# Patient Record
Sex: FEMALE | Race: WHITE | NOT HISPANIC OR LATINO
[De-identification: names, ages, dates, MRNs, and addresses within clinical notes are randomized per-mention and may not be internally consistent; named-entity substitution may affect disease eponyms.]

---

## 2017-09-01 ENCOUNTER — LABORATORY RESULT (OUTPATIENT)
Age: 37
End: 2017-09-01

## 2017-09-01 ENCOUNTER — APPOINTMENT (OUTPATIENT)
Dept: PEDIATRIC MEDICAL GENETICS | Facility: CLINIC | Age: 37
End: 2017-09-01
Payer: COMMERCIAL

## 2017-09-01 PROCEDURE — 36415 COLL VENOUS BLD VENIPUNCTURE: CPT

## 2017-09-01 PROCEDURE — 96040: CPT

## 2017-09-11 ENCOUNTER — ASOB RESULT (OUTPATIENT)
Age: 37
End: 2017-09-11

## 2017-09-11 ENCOUNTER — APPOINTMENT (OUTPATIENT)
Dept: ANTEPARTUM | Facility: CLINIC | Age: 37
End: 2017-09-11
Payer: COMMERCIAL

## 2017-09-11 PROCEDURE — 36416 COLLJ CAPILLARY BLOOD SPEC: CPT

## 2017-09-11 PROCEDURE — 76801 OB US < 14 WKS SINGLE FETUS: CPT

## 2017-09-11 PROCEDURE — 76813 OB US NUCHAL MEAS 1 GEST: CPT

## 2017-11-06 ENCOUNTER — ASOB RESULT (OUTPATIENT)
Age: 37
End: 2017-11-06

## 2017-11-06 ENCOUNTER — APPOINTMENT (OUTPATIENT)
Dept: ANTEPARTUM | Facility: CLINIC | Age: 37
End: 2017-11-06
Payer: COMMERCIAL

## 2017-11-06 PROCEDURE — 76811 OB US DETAILED SNGL FETUS: CPT

## 2017-11-10 ENCOUNTER — APPOINTMENT (OUTPATIENT)
Dept: ANTEPARTUM | Facility: CLINIC | Age: 37
End: 2017-11-10
Payer: COMMERCIAL

## 2017-11-10 ENCOUNTER — ASOB RESULT (OUTPATIENT)
Age: 37
End: 2017-11-10

## 2017-11-10 PROCEDURE — 76816 OB US FOLLOW-UP PER FETUS: CPT

## 2017-12-22 ENCOUNTER — OUTPATIENT (OUTPATIENT)
Dept: OUTPATIENT SERVICES | Facility: HOSPITAL | Age: 37
LOS: 1 days | End: 2017-12-22

## 2017-12-22 DIAGNOSIS — O26.899 OTHER SPECIFIED PREGNANCY RELATED CONDITIONS, UNSPECIFIED TRIMESTER: ICD-10-CM

## 2017-12-23 LAB
APPEARANCE UR: CLEAR — SIGNIFICANT CHANGE UP
BACTERIA # UR AUTO: SIGNIFICANT CHANGE UP
BILIRUB UR-MCNC: NEGATIVE — SIGNIFICANT CHANGE UP
BLOOD UR QL VISUAL: HIGH
COLOR SPEC: SIGNIFICANT CHANGE UP
GLUCOSE UR-MCNC: NEGATIVE — SIGNIFICANT CHANGE UP
KETONES UR-MCNC: NEGATIVE — SIGNIFICANT CHANGE UP
LEUKOCYTE ESTERASE UR-ACNC: NEGATIVE — SIGNIFICANT CHANGE UP
MUCOUS THREADS # UR AUTO: SIGNIFICANT CHANGE UP
NITRITE UR-MCNC: NEGATIVE — SIGNIFICANT CHANGE UP
NON-SQ EPI CELLS # UR AUTO: <1 — SIGNIFICANT CHANGE UP
PH UR: 6.5 — SIGNIFICANT CHANGE UP (ref 4.6–8)
PROT UR-MCNC: NEGATIVE MG/DL — SIGNIFICANT CHANGE UP
RBC CASTS # UR COMP ASSIST: SIGNIFICANT CHANGE UP (ref 0–?)
SP GR SPEC: 1 — SIGNIFICANT CHANGE UP (ref 1–1.04)
SQUAMOUS # UR AUTO: SIGNIFICANT CHANGE UP
UROBILINOGEN FLD QL: NORMAL MG/DL — SIGNIFICANT CHANGE UP
WBC CLUMPS #/AREA URNS HPF: PRESENT — HIGH (ref 0–?)
WBC UR QL: SIGNIFICANT CHANGE UP (ref 0–?)

## 2018-02-19 ENCOUNTER — TRANSCRIPTION ENCOUNTER (OUTPATIENT)
Age: 38
End: 2018-02-19

## 2018-02-19 ENCOUNTER — INPATIENT (INPATIENT)
Facility: HOSPITAL | Age: 38
LOS: 4 days | Discharge: HOME HEALTH | End: 2018-02-24
Attending: OBSTETRICS & GYNECOLOGY | Admitting: OBSTETRICS & GYNECOLOGY
Payer: COMMERCIAL

## 2018-02-19 VITALS — WEIGHT: 194.01 LBS | HEIGHT: 64 IN

## 2018-02-19 DIAGNOSIS — O26.899 OTHER SPECIFIED PREGNANCY RELATED CONDITIONS, UNSPECIFIED TRIMESTER: ICD-10-CM

## 2018-02-19 DIAGNOSIS — Z3A.00 WEEKS OF GESTATION OF PREGNANCY NOT SPECIFIED: ICD-10-CM

## 2018-02-19 LAB
ALBUMIN SERPL ELPH-MCNC: 2.9 G/DL — LOW (ref 3.3–5)
ALBUMIN SERPL ELPH-MCNC: 3 G/DL — LOW (ref 3.3–5)
ALP SERPL-CCNC: 162 U/L — HIGH (ref 40–120)
ALP SERPL-CCNC: 183 U/L — HIGH (ref 40–120)
ALT FLD-CCNC: 5 U/L — SIGNIFICANT CHANGE UP (ref 4–33)
ALT FLD-CCNC: 6 U/L — SIGNIFICANT CHANGE UP (ref 4–33)
APPEARANCE UR: SIGNIFICANT CHANGE UP
APTT BLD: 25.1 SEC — LOW (ref 27.5–37.4)
APTT BLD: 25.7 SEC — LOW (ref 27.5–37.4)
AST SERPL-CCNC: 15 U/L — SIGNIFICANT CHANGE UP (ref 4–32)
AST SERPL-CCNC: 17 U/L — SIGNIFICANT CHANGE UP (ref 4–32)
BACTERIA # UR AUTO: SIGNIFICANT CHANGE UP
BASOPHILS # BLD AUTO: 0.03 K/UL — SIGNIFICANT CHANGE UP (ref 0–0.2)
BASOPHILS # BLD AUTO: 0.03 K/UL — SIGNIFICANT CHANGE UP (ref 0–0.2)
BASOPHILS NFR BLD AUTO: 0.2 % — SIGNIFICANT CHANGE UP (ref 0–2)
BASOPHILS NFR BLD AUTO: 0.4 % — SIGNIFICANT CHANGE UP (ref 0–2)
BILIRUB SERPL-MCNC: 0.2 MG/DL — SIGNIFICANT CHANGE UP (ref 0.2–1.2)
BILIRUB SERPL-MCNC: 0.2 MG/DL — SIGNIFICANT CHANGE UP (ref 0.2–1.2)
BILIRUB UR-MCNC: NEGATIVE — SIGNIFICANT CHANGE UP
BLD GP AB SCN SERPL QL: NEGATIVE — SIGNIFICANT CHANGE UP
BLOOD UR QL VISUAL: NEGATIVE — SIGNIFICANT CHANGE UP
BUN SERPL-MCNC: 5 MG/DL — LOW (ref 7–23)
BUN SERPL-MCNC: 6 MG/DL — LOW (ref 7–23)
CALCIUM SERPL-MCNC: 8.3 MG/DL — LOW (ref 8.4–10.5)
CALCIUM SERPL-MCNC: 8.5 MG/DL — SIGNIFICANT CHANGE UP (ref 8.4–10.5)
CHLORIDE SERPL-SCNC: 101 MMOL/L — SIGNIFICANT CHANGE UP (ref 98–107)
CHLORIDE SERPL-SCNC: 104 MMOL/L — SIGNIFICANT CHANGE UP (ref 98–107)
CO2 SERPL-SCNC: 20 MMOL/L — LOW (ref 22–31)
CO2 SERPL-SCNC: 21 MMOL/L — LOW (ref 22–31)
COLOR SPEC: YELLOW — SIGNIFICANT CHANGE UP
CREAT ?TM UR-MCNC: 68.7 MG/DL — SIGNIFICANT CHANGE UP
CREAT SERPL-MCNC: 0.53 MG/DL — SIGNIFICANT CHANGE UP (ref 0.5–1.3)
CREAT SERPL-MCNC: 0.6 MG/DL — SIGNIFICANT CHANGE UP (ref 0.5–1.3)
EOSINOPHIL # BLD AUTO: 0 K/UL — SIGNIFICANT CHANGE UP (ref 0–0.5)
EOSINOPHIL # BLD AUTO: 0.04 K/UL — SIGNIFICANT CHANGE UP (ref 0–0.5)
EOSINOPHIL NFR BLD AUTO: 0 % — SIGNIFICANT CHANGE UP (ref 0–6)
EOSINOPHIL NFR BLD AUTO: 0.5 % — SIGNIFICANT CHANGE UP (ref 0–6)
FIBRINOGEN PPP-MCNC: 533 MG/DL — HIGH (ref 310–510)
FIBRINOGEN PPP-MCNC: 608 MG/DL — HIGH (ref 310–510)
GLUCOSE SERPL-MCNC: 101 MG/DL — HIGH (ref 70–99)
GLUCOSE SERPL-MCNC: 80 MG/DL — SIGNIFICANT CHANGE UP (ref 70–99)
GLUCOSE UR-MCNC: NEGATIVE — SIGNIFICANT CHANGE UP
HBV SURFACE AG SER-ACNC: NEGATIVE — SIGNIFICANT CHANGE UP
HCT VFR BLD CALC: 31.8 % — LOW (ref 34.5–45)
HCT VFR BLD CALC: 35.3 % — SIGNIFICANT CHANGE UP (ref 34.5–45)
HGB BLD-MCNC: 10.4 G/DL — LOW (ref 11.5–15.5)
HGB BLD-MCNC: 11.5 G/DL — SIGNIFICANT CHANGE UP (ref 11.5–15.5)
HIV1 AG SER QL: SIGNIFICANT CHANGE UP
HIV1+2 AB SPEC QL: SIGNIFICANT CHANGE UP
IMM GRANULOCYTES # BLD AUTO: 0.02 # — SIGNIFICANT CHANGE UP
IMM GRANULOCYTES # BLD AUTO: 0.07 # — SIGNIFICANT CHANGE UP
IMM GRANULOCYTES NFR BLD AUTO: 0.3 % — SIGNIFICANT CHANGE UP (ref 0–1.5)
IMM GRANULOCYTES NFR BLD AUTO: 0.4 % — SIGNIFICANT CHANGE UP (ref 0–1.5)
INR BLD: 0.81 — LOW (ref 0.88–1.17)
INR BLD: 0.87 — LOW (ref 0.88–1.17)
KETONES UR-MCNC: NEGATIVE — SIGNIFICANT CHANGE UP
LDH SERPL L TO P-CCNC: 232 U/L — HIGH (ref 135–225)
LDH SERPL L TO P-CCNC: 257 U/L — HIGH (ref 135–225)
LEUKOCYTE ESTERASE UR-ACNC: NEGATIVE — SIGNIFICANT CHANGE UP
LYMPHOCYTES # BLD AUTO: 0.55 K/UL — LOW (ref 1–3.3)
LYMPHOCYTES # BLD AUTO: 1.18 K/UL — SIGNIFICANT CHANGE UP (ref 1–3.3)
LYMPHOCYTES # BLD AUTO: 14.8 % — SIGNIFICANT CHANGE UP (ref 13–44)
LYMPHOCYTES # BLD AUTO: 3.1 % — LOW (ref 13–44)
MCHC RBC-ENTMCNC: 29.6 PG — SIGNIFICANT CHANGE UP (ref 27–34)
MCHC RBC-ENTMCNC: 30 PG — SIGNIFICANT CHANGE UP (ref 27–34)
MCHC RBC-ENTMCNC: 32.6 % — SIGNIFICANT CHANGE UP (ref 32–36)
MCHC RBC-ENTMCNC: 32.7 % — SIGNIFICANT CHANGE UP (ref 32–36)
MCV RBC AUTO: 90.7 FL — SIGNIFICANT CHANGE UP (ref 80–100)
MCV RBC AUTO: 91.6 FL — SIGNIFICANT CHANGE UP (ref 80–100)
MONOCYTES # BLD AUTO: 0.47 K/UL — SIGNIFICANT CHANGE UP (ref 0–0.9)
MONOCYTES # BLD AUTO: 0.63 K/UL — SIGNIFICANT CHANGE UP (ref 0–0.9)
MONOCYTES NFR BLD AUTO: 2.6 % — SIGNIFICANT CHANGE UP (ref 2–14)
MONOCYTES NFR BLD AUTO: 7.9 % — SIGNIFICANT CHANGE UP (ref 2–14)
MUCOUS THREADS # UR AUTO: SIGNIFICANT CHANGE UP
NEUTROPHILS # BLD AUTO: 16.79 K/UL — HIGH (ref 1.8–7.4)
NEUTROPHILS # BLD AUTO: 6.07 K/UL — SIGNIFICANT CHANGE UP (ref 1.8–7.4)
NEUTROPHILS NFR BLD AUTO: 76.1 % — SIGNIFICANT CHANGE UP (ref 43–77)
NEUTROPHILS NFR BLD AUTO: 93.7 % — HIGH (ref 43–77)
NITRITE UR-MCNC: NEGATIVE — SIGNIFICANT CHANGE UP
NON-SQ EPI CELLS # UR AUTO: <1 — SIGNIFICANT CHANGE UP
NRBC # FLD: 0 — SIGNIFICANT CHANGE UP
NRBC # FLD: 0 — SIGNIFICANT CHANGE UP
PH UR: 7 — SIGNIFICANT CHANGE UP (ref 4.6–8)
PLATELET # BLD AUTO: 358 K/UL — SIGNIFICANT CHANGE UP (ref 150–400)
PLATELET # BLD AUTO: 375 K/UL — SIGNIFICANT CHANGE UP (ref 150–400)
PMV BLD: 10.7 FL — SIGNIFICANT CHANGE UP (ref 7–13)
PMV BLD: 10.9 FL — SIGNIFICANT CHANGE UP (ref 7–13)
POTASSIUM SERPL-MCNC: 3.9 MMOL/L — SIGNIFICANT CHANGE UP (ref 3.5–5.3)
POTASSIUM SERPL-MCNC: 4 MMOL/L — SIGNIFICANT CHANGE UP (ref 3.5–5.3)
POTASSIUM SERPL-SCNC: 3.9 MMOL/L — SIGNIFICANT CHANGE UP (ref 3.5–5.3)
POTASSIUM SERPL-SCNC: 4 MMOL/L — SIGNIFICANT CHANGE UP (ref 3.5–5.3)
PROT SERPL-MCNC: 5.4 G/DL — LOW (ref 6–8.3)
PROT SERPL-MCNC: 5.8 G/DL — LOW (ref 6–8.3)
PROT UR-MCNC: 30 MG/DL — HIGH
PROT UR-MCNC: 32.7 MG/DL — SIGNIFICANT CHANGE UP
PROTHROM AB SERPL-ACNC: 8.9 SEC — LOW (ref 9.8–13.1)
PROTHROM AB SERPL-ACNC: 9.6 SEC — LOW (ref 9.8–13.1)
RBC # BLD: 3.47 M/UL — LOW (ref 3.8–5.2)
RBC # BLD: 3.89 M/UL — SIGNIFICANT CHANGE UP (ref 3.8–5.2)
RBC # FLD: 12.3 % — SIGNIFICANT CHANGE UP (ref 10.3–14.5)
RBC # FLD: 12.4 % — SIGNIFICANT CHANGE UP (ref 10.3–14.5)
RBC CASTS # UR COMP ASSIST: HIGH (ref 0–?)
RH IG SCN BLD-IMP: POSITIVE — SIGNIFICANT CHANGE UP
RH IG SCN BLD-IMP: POSITIVE — SIGNIFICANT CHANGE UP
RUBV IGG SER-ACNC: 5.6 INDEX — SIGNIFICANT CHANGE UP
RUBV IGG SER-IMP: POSITIVE — SIGNIFICANT CHANGE UP
SODIUM SERPL-SCNC: 136 MMOL/L — SIGNIFICANT CHANGE UP (ref 135–145)
SODIUM SERPL-SCNC: 138 MMOL/L — SIGNIFICANT CHANGE UP (ref 135–145)
SP GR SPEC: 1.01 — SIGNIFICANT CHANGE UP (ref 1–1.04)
SQUAMOUS # UR AUTO: SIGNIFICANT CHANGE UP
URATE SERPL-MCNC: 5.5 MG/DL — SIGNIFICANT CHANGE UP (ref 2.5–7)
URATE SERPL-MCNC: 6 MG/DL — SIGNIFICANT CHANGE UP (ref 2.5–7)
UROBILINOGEN FLD QL: NORMAL MG/DL — SIGNIFICANT CHANGE UP
WBC # BLD: 17.91 K/UL — HIGH (ref 3.8–10.5)
WBC # BLD: 7.97 K/UL — SIGNIFICANT CHANGE UP (ref 3.8–10.5)
WBC # FLD AUTO: 17.91 K/UL — HIGH (ref 3.8–10.5)
WBC # FLD AUTO: 7.97 K/UL — SIGNIFICANT CHANGE UP (ref 3.8–10.5)
WBC UR QL: SIGNIFICANT CHANGE UP (ref 0–?)

## 2018-02-19 RX ORDER — AMPICILLIN TRIHYDRATE 250 MG
2 CAPSULE ORAL EVERY 6 HOURS
Qty: 0 | Refills: 0 | Status: DISCONTINUED | OUTPATIENT
Start: 2018-02-19 | End: 2018-02-19

## 2018-02-19 RX ORDER — CITRIC ACID/SODIUM CITRATE 300-500 MG
30 SOLUTION, ORAL ORAL ONCE
Qty: 0 | Refills: 0 | Status: COMPLETED | OUTPATIENT
Start: 2018-02-19 | End: 2018-02-19

## 2018-02-19 RX ORDER — GLYCERIN ADULT
1 SUPPOSITORY, RECTAL RECTAL AT BEDTIME
Qty: 0 | Refills: 0 | Status: DISCONTINUED | OUTPATIENT
Start: 2018-02-20 | End: 2018-02-24

## 2018-02-19 RX ORDER — HEPARIN SODIUM 5000 [USP'U]/ML
5000 INJECTION INTRAVENOUS; SUBCUTANEOUS EVERY 12 HOURS
Qty: 0 | Refills: 0 | Status: DISCONTINUED | OUTPATIENT
Start: 2018-02-19 | End: 2018-02-24

## 2018-02-19 RX ORDER — ESCITALOPRAM OXALATE 10 MG/1
40 TABLET, FILM COATED ORAL DAILY
Qty: 0 | Refills: 0 | Status: DISCONTINUED | OUTPATIENT
Start: 2018-02-19 | End: 2018-02-24

## 2018-02-19 RX ORDER — KETOROLAC TROMETHAMINE 30 MG/ML
30 SYRINGE (ML) INJECTION ONCE
Qty: 0 | Refills: 0 | Status: DISCONTINUED | OUTPATIENT
Start: 2018-02-19 | End: 2018-02-19

## 2018-02-19 RX ORDER — TETANUS TOXOID, REDUCED DIPHTHERIA TOXOID AND ACELLULAR PERTUSSIS VACCINE, ADSORBED 5; 2.5; 8; 8; 2.5 [IU]/.5ML; [IU]/.5ML; UG/.5ML; UG/.5ML; UG/.5ML
0.5 SUSPENSION INTRAMUSCULAR ONCE
Qty: 0 | Refills: 0 | Status: DISCONTINUED | OUTPATIENT
Start: 2018-02-20 | End: 2018-02-24

## 2018-02-19 RX ORDER — NALOXONE HYDROCHLORIDE 4 MG/.1ML
0.1 SPRAY NASAL
Qty: 0 | Refills: 0 | Status: DISCONTINUED | OUTPATIENT
Start: 2018-02-19 | End: 2018-02-24

## 2018-02-19 RX ORDER — SODIUM CHLORIDE 9 MG/ML
1000 INJECTION, SOLUTION INTRAVENOUS ONCE
Qty: 0 | Refills: 0 | Status: COMPLETED | OUTPATIENT
Start: 2018-02-19 | End: 2018-02-19

## 2018-02-19 RX ORDER — LANOLIN
1 OINTMENT (GRAM) TOPICAL
Qty: 0 | Refills: 0 | Status: DISCONTINUED | OUTPATIENT
Start: 2018-02-20 | End: 2018-02-24

## 2018-02-19 RX ORDER — DIPHENHYDRAMINE HCL 50 MG
25 CAPSULE ORAL EVERY 6 HOURS
Qty: 0 | Refills: 0 | Status: DISCONTINUED | OUTPATIENT
Start: 2018-02-20 | End: 2018-02-24

## 2018-02-19 RX ORDER — METOCLOPRAMIDE HCL 10 MG
10 TABLET ORAL ONCE
Qty: 0 | Refills: 0 | Status: COMPLETED | OUTPATIENT
Start: 2018-02-19 | End: 2018-02-19

## 2018-02-19 RX ORDER — SIMETHICONE 80 MG/1
80 TABLET, CHEWABLE ORAL EVERY 4 HOURS
Qty: 0 | Refills: 0 | Status: DISCONTINUED | OUTPATIENT
Start: 2018-02-20 | End: 2018-02-24

## 2018-02-19 RX ORDER — OXYCODONE HYDROCHLORIDE 5 MG/1
5 TABLET ORAL
Qty: 0 | Refills: 0 | Status: DISCONTINUED | OUTPATIENT
Start: 2018-02-19 | End: 2018-02-24

## 2018-02-19 RX ORDER — CITRIC ACID/SODIUM CITRATE 300-500 MG
15 SOLUTION, ORAL ORAL EVERY 4 HOURS
Qty: 0 | Refills: 0 | Status: DISCONTINUED | OUTPATIENT
Start: 2018-02-19 | End: 2018-02-19

## 2018-02-19 RX ORDER — OXYTOCIN 10 UNIT/ML
41.67 VIAL (ML) INJECTION
Qty: 20 | Refills: 0 | Status: DISCONTINUED | OUTPATIENT
Start: 2018-02-19 | End: 2018-02-24

## 2018-02-19 RX ORDER — DIPHENHYDRAMINE HCL 50 MG
25 CAPSULE ORAL ONCE
Qty: 0 | Refills: 0 | Status: DISCONTINUED | OUTPATIENT
Start: 2018-02-19 | End: 2018-02-24

## 2018-02-19 RX ORDER — MORPHINE SULFATE 50 MG/1
3 CAPSULE, EXTENDED RELEASE ORAL ONCE
Qty: 0 | Refills: 0 | Status: DISCONTINUED | OUTPATIENT
Start: 2018-02-19 | End: 2018-02-24

## 2018-02-19 RX ORDER — OXYCODONE HYDROCHLORIDE 5 MG/1
10 TABLET ORAL
Qty: 0 | Refills: 0 | Status: DISCONTINUED | OUTPATIENT
Start: 2018-02-19 | End: 2018-02-24

## 2018-02-19 RX ORDER — IBUPROFEN 200 MG
600 TABLET ORAL EVERY 6 HOURS
Qty: 0 | Refills: 0 | Status: COMPLETED | OUTPATIENT
Start: 2018-02-19 | End: 2019-01-18

## 2018-02-19 RX ORDER — OXYTOCIN 10 UNIT/ML
41.67 VIAL (ML) INJECTION
Qty: 20 | Refills: 0 | Status: DISCONTINUED | OUTPATIENT
Start: 2018-02-19 | End: 2018-02-19

## 2018-02-19 RX ORDER — ACETAMINOPHEN 500 MG
975 TABLET ORAL EVERY 6 HOURS
Qty: 0 | Refills: 0 | Status: DISCONTINUED | OUTPATIENT
Start: 2018-02-19 | End: 2018-02-24

## 2018-02-19 RX ORDER — OXYCODONE HYDROCHLORIDE 5 MG/1
5 TABLET ORAL EVERY 4 HOURS
Qty: 0 | Refills: 0 | Status: DISCONTINUED | OUTPATIENT
Start: 2018-02-19 | End: 2018-02-24

## 2018-02-19 RX ORDER — FAMOTIDINE 10 MG/ML
20 INJECTION INTRAVENOUS ONCE
Qty: 0 | Refills: 0 | Status: COMPLETED | OUTPATIENT
Start: 2018-02-19 | End: 2018-02-19

## 2018-02-19 RX ORDER — DOCUSATE SODIUM 100 MG
100 CAPSULE ORAL
Qty: 0 | Refills: 0 | Status: DISCONTINUED | OUTPATIENT
Start: 2018-02-20 | End: 2018-02-24

## 2018-02-19 RX ORDER — SODIUM CHLORIDE 9 MG/ML
1000 INJECTION, SOLUTION INTRAVENOUS
Qty: 0 | Refills: 0 | Status: DISCONTINUED | OUTPATIENT
Start: 2018-02-19 | End: 2018-02-19

## 2018-02-19 RX ORDER — AMPICILLIN TRIHYDRATE 250 MG
2 CAPSULE ORAL ONCE
Qty: 0 | Refills: 0 | Status: COMPLETED | OUTPATIENT
Start: 2018-02-19 | End: 2018-02-19

## 2018-02-19 RX ORDER — AMITRIPTYLINE HCL 25 MG
150 TABLET ORAL AT BEDTIME
Qty: 0 | Refills: 0 | Status: DISCONTINUED | OUTPATIENT
Start: 2018-02-19 | End: 2018-02-24

## 2018-02-19 RX ORDER — IBUPROFEN 200 MG
1 TABLET ORAL
Qty: 0 | Refills: 0 | COMMUNITY
Start: 2018-02-19

## 2018-02-19 RX ORDER — SODIUM CHLORIDE 9 MG/ML
1000 INJECTION, SOLUTION INTRAVENOUS
Qty: 0 | Refills: 0 | Status: DISCONTINUED | OUTPATIENT
Start: 2018-02-19 | End: 2018-02-20

## 2018-02-19 RX ORDER — OXYTOCIN 10 UNIT/ML
333.33 VIAL (ML) INJECTION
Qty: 20 | Refills: 0 | Status: DISCONTINUED | OUTPATIENT
Start: 2018-02-19 | End: 2018-02-24

## 2018-02-19 RX ORDER — AMPICILLIN TRIHYDRATE 250 MG
CAPSULE ORAL
Qty: 0 | Refills: 0 | Status: DISCONTINUED | OUTPATIENT
Start: 2018-02-19 | End: 2018-02-19

## 2018-02-19 RX ORDER — KETOROLAC TROMETHAMINE 30 MG/ML
30 SYRINGE (ML) INJECTION EVERY 6 HOURS
Qty: 0 | Refills: 0 | Status: COMPLETED | OUTPATIENT
Start: 2018-02-19 | End: 2018-02-21

## 2018-02-19 RX ORDER — SODIUM CHLORIDE 9 MG/ML
1000 INJECTION, SOLUTION INTRAVENOUS
Qty: 0 | Refills: 0 | Status: DISCONTINUED | OUTPATIENT
Start: 2018-02-19 | End: 2018-02-24

## 2018-02-19 RX ORDER — OXYTOCIN 10 UNIT/ML
333.33 VIAL (ML) INJECTION
Qty: 20 | Refills: 0 | Status: DISCONTINUED | OUTPATIENT
Start: 2018-02-19 | End: 2018-02-19

## 2018-02-19 RX ORDER — ONDANSETRON 8 MG/1
4 TABLET, FILM COATED ORAL EVERY 6 HOURS
Qty: 0 | Refills: 0 | Status: DISCONTINUED | OUTPATIENT
Start: 2018-02-19 | End: 2018-02-24

## 2018-02-19 RX ORDER — HYDROMORPHONE HYDROCHLORIDE 2 MG/ML
1 INJECTION INTRAMUSCULAR; INTRAVENOUS; SUBCUTANEOUS
Qty: 0 | Refills: 0 | Status: DISCONTINUED | OUTPATIENT
Start: 2018-02-19 | End: 2018-02-24

## 2018-02-19 RX ADMIN — Medication 216 GRAM(S): at 09:30

## 2018-02-19 RX ADMIN — Medication 975 MILLIGRAM(S): at 22:47

## 2018-02-19 RX ADMIN — Medication 0.5 MILLIGRAM(S): at 10:51

## 2018-02-19 RX ADMIN — Medication 12 MILLIGRAM(S): at 09:12

## 2018-02-19 RX ADMIN — SODIUM CHLORIDE 2000 MILLILITER(S): 9 INJECTION, SOLUTION INTRAVENOUS at 11:30

## 2018-02-19 RX ADMIN — Medication 0.5 MILLIGRAM(S): at 17:29

## 2018-02-19 RX ADMIN — Medication 125 MILLIUNIT(S)/MIN: at 18:16

## 2018-02-19 RX ADMIN — SODIUM CHLORIDE 125 MILLILITER(S): 9 INJECTION, SOLUTION INTRAVENOUS at 23:00

## 2018-02-19 RX ADMIN — Medication 30 MILLIGRAM(S): at 15:58

## 2018-02-19 RX ADMIN — SODIUM CHLORIDE 75 MILLILITER(S): 9 INJECTION, SOLUTION INTRAVENOUS at 18:16

## 2018-02-19 RX ADMIN — Medication 150 MILLIGRAM(S): at 21:17

## 2018-02-19 RX ADMIN — SODIUM CHLORIDE 2000 MILLILITER(S): 9 INJECTION, SOLUTION INTRAVENOUS at 13:52

## 2018-02-19 RX ADMIN — Medication 30 MILLILITER(S): at 12:00

## 2018-02-19 RX ADMIN — SODIUM CHLORIDE 250 MILLILITER(S): 9 INJECTION, SOLUTION INTRAVENOUS at 09:51

## 2018-02-19 RX ADMIN — HEPARIN SODIUM 5000 UNIT(S): 5000 INJECTION INTRAVENOUS; SUBCUTANEOUS at 21:15

## 2018-02-19 RX ADMIN — FAMOTIDINE 20 MILLIGRAM(S): 10 INJECTION INTRAVENOUS at 11:00

## 2018-02-19 RX ADMIN — Medication 975 MILLIGRAM(S): at 23:17

## 2018-02-19 RX ADMIN — Medication 0.5 MILLIGRAM(S): at 21:17

## 2018-02-19 RX ADMIN — Medication 10 MILLIGRAM(S): at 11:00

## 2018-02-19 NOTE — PROVIDER CONTACT NOTE (OTHER) - ASSESSMENT
lungs clear, reflexes 2+, fundus firm without lmassage, bleeding scant,no complain of shortness of breath or headache or dizziness

## 2018-02-19 NOTE — DISCHARGE NOTE OB - PLAN OF CARE
post partum recovery, vaginal rest, light activity postpartum preeclampsia . mgso4  and procardia Now stable and recovered postoperative recovery

## 2018-02-19 NOTE — DISCHARGE NOTE OB - MEDICATION SUMMARY - MEDICATIONS TO STOP TAKING
I will STOP taking the medications listed below when I get home from the hospital:    Prenatal 1 Prenatal Multivitamins with Folic Acid 0.975 mg oral capsule  -- 1 cap(s) by mouth once a day

## 2018-02-19 NOTE — DISCHARGE NOTE OB - HOME CARE AGENCY
[facility]  [phone] .   Nurse to call and visit day after discharge 2/25/2018

## 2018-02-19 NOTE — DISCHARGE NOTE OB - PATIENT PORTAL LINK FT
You can access the [facility] Patient Portal, offered by [facility], by registering with the following website: [email or web address]

## 2018-02-19 NOTE — DISCHARGE NOTE OB - HOSPITAL COURSE
Admitted with ruptured membranes at 35.6 weeks. Repeat LFT  section performed. Admitted with ruptured membranes at 35.6 weeks. Repeat LFT  section performed. Postpartum preeclampsia - received Mg so4 and Procardia x1 with resolution of blood pressure elevation On POD#5 stable normal Blood pressures with one bp 140/70 off Procardia - discharged to home on Procardia and referral for home nursing care visitation

## 2018-02-19 NOTE — DISCHARGE NOTE OB - CARE PROVIDER_API CALL
Rocío Perez), Obstetrics and Gynecology  [address] [place] [address]  Phone: [phone]  Fax: [phone]

## 2018-02-19 NOTE — CHART NOTE - NSCHARTNOTEFT_GEN_A_CORE
S: Patient examined at bedside for elevated BPs. According to nursing, patient had severe range BPs while patient was experiencing pain and anxiety. BPs decreased following ativan and toradol. Patient has no acute complaints at this time. Pain now controlled. Vaginal bleeding is appropriate. Denies HA, changes in vision, lightheadedness, dizziness, CP, SOB, palpitations, calf tenderness. Per patient, she had mildly elevated BPs earlier in the pregnancy and was unable to complete the 24hr urine 2/2 her schedule. Patient also reports history of severe anxiety, taking ativan 4-6 times per day. HELLP labs were sent at admission and were WNL.     O: /90    Gen: NAD  CV: tachycardia, regular rhythm, no murmurs  Resp: CTA  Abd: soft, appropriately tender, pfannenstiel incision with dressing in place  : lochia WNL, no expression of blood with fundal massage  LE: no calf tenderness  Neuro: CN II-XII intact, EOMI, PERRLA     A/P 37   POD#0 from [facility] for PPROM evaluated for elevated BPs. PMHx of anxiety and depression  - Stat HELLP labs WNL  - Will continue to monitor BPs at this time as BPs may be 2/2 severe anxiety. Will consider labetalol if pressures remain elevated (150s/90s)  - Encourage po hydration  - pad counts  - Routine postpartum care  - Will get social work consult postpartum    d/w Dr. Devon Kemp pgy1

## 2018-02-19 NOTE — PATIENT PROFILE OB - CURRENT PREGNANCY COMPLICATIONS, OB PROFILE
Maternal Unknown GBS/ Labor/Gestational Age less than 36 Weeks/ Premature Rupture of Membranes (PPROM)

## 2018-02-19 NOTE — DISCHARGE NOTE OB - ADDITIONAL INSTRUCTIONS
Call office to make appt in 2 weeks Call office to make appt in 2 weeks  Call doctor if blood pressure is higher than 155/90

## 2018-02-19 NOTE — DISCHARGE NOTE OB - CARE PLAN
Principal Discharge DX:	Premature rupture of membranes (PROM) affecting second pregnancy  Goal:	post partum recovery, vaginal rest, light activity  Secondary Diagnosis:	 delivery delivered Principal Discharge DX:	Premature rupture of membranes (PROM) affecting second pregnancy  Goal:	post partum recovery, vaginal rest, light activity  Assessment and plan of treatment:	postpartum preeclampsia . mgso4  and procardia Now stable and recovered  Secondary Diagnosis:	 delivery delivered  Goal:	postoperative recovery

## 2018-02-20 LAB
BASOPHILS # BLD AUTO: 0.01 K/UL — SIGNIFICANT CHANGE UP (ref 0–0.2)
BASOPHILS NFR BLD AUTO: 0.1 % — SIGNIFICANT CHANGE UP (ref 0–2)
EOSINOPHIL # BLD AUTO: 0 K/UL — SIGNIFICANT CHANGE UP (ref 0–0.5)
EOSINOPHIL NFR BLD AUTO: 0 % — SIGNIFICANT CHANGE UP (ref 0–6)
HCT VFR BLD CALC: 26.4 % — LOW (ref 34.5–45)
HGB BLD-MCNC: 8.7 G/DL — LOW (ref 11.5–15.5)
IMM GRANULOCYTES # BLD AUTO: 0.07 # — SIGNIFICANT CHANGE UP
IMM GRANULOCYTES NFR BLD AUTO: 0.5 % — SIGNIFICANT CHANGE UP (ref 0–1.5)
LYMPHOCYTES # BLD AUTO: 1.13 K/UL — SIGNIFICANT CHANGE UP (ref 1–3.3)
LYMPHOCYTES # BLD AUTO: 7.9 % — LOW (ref 13–44)
MCHC RBC-ENTMCNC: 29.9 PG — SIGNIFICANT CHANGE UP (ref 27–34)
MCHC RBC-ENTMCNC: 33 % — SIGNIFICANT CHANGE UP (ref 32–36)
MCV RBC AUTO: 90.7 FL — SIGNIFICANT CHANGE UP (ref 80–100)
MONOCYTES # BLD AUTO: 1.4 K/UL — HIGH (ref 0–0.9)
MONOCYTES NFR BLD AUTO: 9.7 % — SIGNIFICANT CHANGE UP (ref 2–14)
NEUTROPHILS # BLD AUTO: 11.76 K/UL — HIGH (ref 1.8–7.4)
NEUTROPHILS NFR BLD AUTO: 81.8 % — HIGH (ref 43–77)
NRBC # FLD: 0 — SIGNIFICANT CHANGE UP
PLATELET # BLD AUTO: 334 K/UL — SIGNIFICANT CHANGE UP (ref 150–400)
PMV BLD: 10.7 FL — SIGNIFICANT CHANGE UP (ref 7–13)
RBC # BLD: 2.91 M/UL — LOW (ref 3.8–5.2)
RBC # FLD: 12.4 % — SIGNIFICANT CHANGE UP (ref 10.3–14.5)
WBC # BLD: 14.37 K/UL — HIGH (ref 3.8–10.5)
WBC # FLD AUTO: 14.37 K/UL — HIGH (ref 3.8–10.5)

## 2018-02-20 PROCEDURE — 93010 ELECTROCARDIOGRAM REPORT: CPT

## 2018-02-20 RX ORDER — FAMOTIDINE 10 MG/ML
20 INJECTION INTRAVENOUS
Qty: 0 | Refills: 0 | Status: DISCONTINUED | OUTPATIENT
Start: 2018-02-20 | End: 2018-02-24

## 2018-02-20 RX ADMIN — OXYCODONE HYDROCHLORIDE 5 MILLIGRAM(S): 5 TABLET ORAL at 19:02

## 2018-02-20 RX ADMIN — OXYCODONE HYDROCHLORIDE 5 MILLIGRAM(S): 5 TABLET ORAL at 10:04

## 2018-02-20 RX ADMIN — Medication 25 MILLIGRAM(S): at 15:34

## 2018-02-20 RX ADMIN — Medication 150 MILLIGRAM(S): at 22:59

## 2018-02-20 RX ADMIN — Medication 30 MILLIGRAM(S): at 08:07

## 2018-02-20 RX ADMIN — Medication 30 MILLIGRAM(S): at 00:22

## 2018-02-20 RX ADMIN — Medication 30 MILLIGRAM(S): at 21:21

## 2018-02-20 RX ADMIN — SIMETHICONE 80 MILLIGRAM(S): 80 TABLET, CHEWABLE ORAL at 09:35

## 2018-02-20 RX ADMIN — Medication 975 MILLIGRAM(S): at 14:14

## 2018-02-20 RX ADMIN — Medication 30 MILLIGRAM(S): at 20:42

## 2018-02-20 RX ADMIN — Medication 975 MILLIGRAM(S): at 14:45

## 2018-02-20 RX ADMIN — Medication 975 MILLIGRAM(S): at 21:21

## 2018-02-20 RX ADMIN — Medication 30 MILLIGRAM(S): at 14:15

## 2018-02-20 RX ADMIN — HEPARIN SODIUM 5000 UNIT(S): 5000 INJECTION INTRAVENOUS; SUBCUTANEOUS at 10:24

## 2018-02-20 RX ADMIN — Medication 30 MILLIGRAM(S): at 14:45

## 2018-02-20 RX ADMIN — Medication 30 MILLIGRAM(S): at 00:52

## 2018-02-20 RX ADMIN — Medication 0.5 MILLIGRAM(S): at 22:59

## 2018-02-20 RX ADMIN — Medication 0.5 MILLIGRAM(S): at 02:52

## 2018-02-20 RX ADMIN — Medication 975 MILLIGRAM(S): at 08:37

## 2018-02-20 RX ADMIN — Medication 0.5 MILLIGRAM(S): at 11:35

## 2018-02-20 RX ADMIN — ESCITALOPRAM OXALATE 40 MILLIGRAM(S): 10 TABLET, FILM COATED ORAL at 06:24

## 2018-02-20 RX ADMIN — OXYCODONE HYDROCHLORIDE 5 MILLIGRAM(S): 5 TABLET ORAL at 09:34

## 2018-02-20 RX ADMIN — Medication 975 MILLIGRAM(S): at 20:42

## 2018-02-20 RX ADMIN — OXYCODONE HYDROCHLORIDE 5 MILLIGRAM(S): 5 TABLET ORAL at 14:45

## 2018-02-20 RX ADMIN — Medication 0.5 MILLIGRAM(S): at 02:53

## 2018-02-20 RX ADMIN — OXYCODONE HYDROCHLORIDE 5 MILLIGRAM(S): 5 TABLET ORAL at 03:31

## 2018-02-20 RX ADMIN — OXYCODONE HYDROCHLORIDE 5 MILLIGRAM(S): 5 TABLET ORAL at 14:15

## 2018-02-20 RX ADMIN — OXYCODONE HYDROCHLORIDE 5 MILLIGRAM(S): 5 TABLET ORAL at 20:42

## 2018-02-20 RX ADMIN — HEPARIN SODIUM 5000 UNIT(S): 5000 INJECTION INTRAVENOUS; SUBCUTANEOUS at 22:59

## 2018-02-20 RX ADMIN — OXYCODONE HYDROCHLORIDE 5 MILLIGRAM(S): 5 TABLET ORAL at 04:25

## 2018-02-20 RX ADMIN — Medication 975 MILLIGRAM(S): at 08:07

## 2018-02-20 RX ADMIN — Medication 30 MILLIGRAM(S): at 08:37

## 2018-02-20 RX ADMIN — FAMOTIDINE 20 MILLIGRAM(S): 10 INJECTION INTRAVENOUS at 22:59

## 2018-02-20 NOTE — PROGRESS NOTE ADULT - SUBJECTIVE AND OBJECTIVE BOX
Postop Day  __1_ s/p   C- Section     THERAPY:  [  ] Spinal morphine   [x  ] Epidural morphine   [  ] IV PCA Hydromorphone 1 mg/ml      Sedation Score:	  [ x ] Alert	    [  ] Drowsy        [  ] Arousable	[  ] Asleep	[  ] Unresponsive    Side Effects:	  [ x ] None	     [  ] Nausea        [  ] Pruritus        [  ] Weakness   [  ] Numbness        ASSESSMENT/ PLAN   [ x  ] Discontinue         [  ] Continue  [ x ]Documentation and Verification of current medications     Comments:

## 2018-02-20 NOTE — PROGRESS NOTE ADULT - ASSESSMENT
36y/o  POD#1 from [facility] in stable condition. PMH significant for anxiety. Current issues include elevated BPs and tachycardia postpartum. HELLP WNL.

## 2018-02-20 NOTE — PROVIDER CONTACT NOTE (OTHER) - ASSESSMENT
T 37.0  P 129  /58  02sat 91%  Fundus firm, at the umbilicus, no clots, light to moderate lochia, urine output wnl  pt with no c/o, sob, dizziness, blurry vision or nausea T 37.0  P 129  /58  02sat 91%  Fundus firm, at the umbilicus, no clots, light to moderate lochia, urine output wnl  pt with no c/o, sob, h/a, dizziness, blurry vision or nausea T 37.0  P 129  /58  02sat 91%  Fundus firm, at the umbilicus, no clots, light to moderate lochia, urine output wnl  pt with no c/o sob, h/a, dizziness, blurry vision or nausea

## 2018-02-20 NOTE — PROGRESS NOTE ADULT - PROBLEM SELECTOR PLAN 1
- Continue with po analgesia  - Increase ambulation  - Continue regular diet  - d/c IV fluids  - Check CBC  - D/c Garnica  - Incision dressing removed  - Social work consult  - Christy Kemp pgy1

## 2018-02-20 NOTE — PROVIDER CONTACT NOTE (OTHER) - RECOMMENDATIONS
02 2L/min via facemask applied  HOB elevated  Dr. Byrne informed, will come to assesses pt 02 @ 2L/min via nasal canula applied  HOB elevated  Dr. Byrne informed, will come to assesses pt

## 2018-02-20 NOTE — PROGRESS NOTE ADULT - SUBJECTIVE AND OBJECTIVE BOX
Patient seen and examined at bedside, no acute overnight events. No acute complaints, pain well controlled. Patient is ambulating and tolerating regular diet. Has not yet passed flatus. Garnica is still in place. Vaginal bleeding is appropriate. Denies HA, changes in vision, CP, SOB, palpitations, calf tenderness. Per patient, she takes ativan 0.5mg q4-6 hrs a day.    Vital Signs Last 24 Hours  T(C): 37 (02-20-18 @ 06:28), Max: 37.2 (02-20-18 @ 03:00)  HR: 101 (02-20-18 @ 06:28) (85 - 142)  BP: 131/77 (02-20-18 @ 06:28) (120/73 - 164/96)  RR: 18 (02-20-18 @ 06:28) (15 - 23)  SpO2: 95% (02-20-18 @ 06:28) (91% - 100%)    I&O's Summary    19 Feb 2018 07:01  -  20 Feb 2018 07:00  --------------------------------------------------------  IN: 4875 mL / OUT: 3330 mL / NET: 1545 mL        Physical Exam:  General: NAD  Abdomen: Soft, non-tender, non-distended, fundus firm  Incision: Pfannenstiel incision CDI, subcuticular suture closure, dressing removed, steri strips in place  Pelvic: Lochia wnl    Labs:    Blood Type: A Positive  Antibody Screen: --  Rubella IgG: Positive (Positive=Immune, Negative=Non-Immune)               8.7    14.37 )-----------( 334      ( 02-20 @ 05:15 )             26.4                10.4   17.91 )-----------( 358      ( 02-19 @ 17:48 )             31.8                11.5   7.97  )-----------( 375      ( 02-19 @ 09:20 )             35.3         MEDICATIONS  (STANDING):  acetaminophen   Tablet. 975 milliGRAM(s) Oral every 6 hours  amitriptyline 150 milliGRAM(s) Oral at bedtime  diphenhydrAMINE   Injectable 25 milliGRAM(s) IV Push once  diphtheria/tetanus/pertussis (acellular) Vaccine (ADAcel) 0.5 milliLiter(s) IntraMuscular once  escitalopram 40 milliGRAM(s) Oral daily  heparin  Injectable 5000 Unit(s) SubCutaneous every 12 hours  ibuprofen  Tablet 600 milliGRAM(s) Oral every 6 hours  ketorolac   Injectable 30 milliGRAM(s) IV Push every 6 hours  lactated ringers. 1000 milliLiter(s) (125 mL/Hr) IV Continuous <Continuous>  morphine PF Epidural 3 milliGRAM(s) Epidural once  oxyCODONE    IR 5 milliGRAM(s) Oral every 3 hours  oxytocin Infusion 41.667 milliUNIT(s)/Min (125 mL/Hr) IV Continuous <Continuous>  oxytocin Infusion 333.333 milliUNIT(s)/Min (1000 mL/Hr) IV Continuous <Continuous>    MEDICATIONS  (PRN):  diphenhydrAMINE   Capsule 25 milliGRAM(s) Oral every 6 hours PRN Itching  docusate sodium 100 milliGRAM(s) Oral two times a day PRN Stool Softening  glycerin Suppository - Adult 1 Suppository(s) Rectal at bedtime PRN Constipation  HYDROmorphone  Injectable 1 milliGRAM(s) IV Push every 3 hours PRN Severe Pain  lanolin Ointment 1 Application(s) Topical every 3 hours PRN Sore Nipples  LORazepam     Tablet 0.5 milliGRAM(s) Oral five times a day PRN Anxiety  naloxone Injectable 0.1 milliGRAM(s) IV Push every 3 minutes PRN For ANY of the following changes in patient status:  A. RR LESS THAN 10 breaths per minute, B. Oxygen saturation LESS THAN 90%, C. Sedation score of 6  ondansetron Injectable 4 milliGRAM(s) IV Push every 6 hours PRN Nausea  oxyCODONE    IR 5 milliGRAM(s) Oral every 4 hours PRN Severe Pain (7 - 10)  oxyCODONE    IR 5 milliGRAM(s) Oral every 3 hours PRN Mild Pain  oxyCODONE    IR 10 milliGRAM(s) Oral every 3 hours PRN Moderate Pain  simethicone 80 milliGRAM(s) Chew every 4 hours PRN Gas

## 2018-02-21 LAB — T PALLIDUM AB TITR SER: NEGATIVE — SIGNIFICANT CHANGE UP

## 2018-02-21 RX ORDER — IBUPROFEN 200 MG
600 TABLET ORAL EVERY 6 HOURS
Qty: 0 | Refills: 0 | Status: DISCONTINUED | OUTPATIENT
Start: 2018-02-21 | End: 2018-02-24

## 2018-02-21 RX ADMIN — ESCITALOPRAM OXALATE 40 MILLIGRAM(S): 10 TABLET, FILM COATED ORAL at 07:22

## 2018-02-21 RX ADMIN — FAMOTIDINE 20 MILLIGRAM(S): 10 INJECTION INTRAVENOUS at 22:26

## 2018-02-21 RX ADMIN — Medication 600 MILLIGRAM(S): at 10:10

## 2018-02-21 RX ADMIN — Medication 150 MILLIGRAM(S): at 22:27

## 2018-02-21 RX ADMIN — Medication 0.5 MILLIGRAM(S): at 04:51

## 2018-02-21 RX ADMIN — FAMOTIDINE 20 MILLIGRAM(S): 10 INJECTION INTRAVENOUS at 10:08

## 2018-02-21 RX ADMIN — Medication 0.5 MILLIGRAM(S): at 13:50

## 2018-02-21 RX ADMIN — OXYCODONE HYDROCHLORIDE 5 MILLIGRAM(S): 5 TABLET ORAL at 19:53

## 2018-02-21 RX ADMIN — Medication 30 MILLIGRAM(S): at 04:51

## 2018-02-21 RX ADMIN — Medication 0.5 MILLIGRAM(S): at 14:36

## 2018-02-21 RX ADMIN — Medication 600 MILLIGRAM(S): at 23:33

## 2018-02-21 RX ADMIN — OXYCODONE HYDROCHLORIDE 5 MILLIGRAM(S): 5 TABLET ORAL at 23:34

## 2018-02-21 RX ADMIN — Medication 0.5 MILLIGRAM(S): at 22:26

## 2018-02-21 RX ADMIN — OXYCODONE HYDROCHLORIDE 5 MILLIGRAM(S): 5 TABLET ORAL at 23:04

## 2018-02-21 RX ADMIN — HEPARIN SODIUM 5000 UNIT(S): 5000 INJECTION INTRAVENOUS; SUBCUTANEOUS at 22:28

## 2018-02-21 RX ADMIN — Medication 975 MILLIGRAM(S): at 19:06

## 2018-02-21 RX ADMIN — Medication 975 MILLIGRAM(S): at 05:25

## 2018-02-21 RX ADMIN — OXYCODONE HYDROCHLORIDE 5 MILLIGRAM(S): 5 TABLET ORAL at 13:50

## 2018-02-21 RX ADMIN — Medication 600 MILLIGRAM(S): at 19:06

## 2018-02-21 RX ADMIN — Medication 975 MILLIGRAM(S): at 04:51

## 2018-02-21 RX ADMIN — Medication 975 MILLIGRAM(S): at 10:10

## 2018-02-21 RX ADMIN — OXYCODONE HYDROCHLORIDE 5 MILLIGRAM(S): 5 TABLET ORAL at 15:33

## 2018-02-21 RX ADMIN — Medication 600 MILLIGRAM(S): at 23:03

## 2018-02-21 RX ADMIN — Medication 600 MILLIGRAM(S): at 17:43

## 2018-02-21 RX ADMIN — Medication 975 MILLIGRAM(S): at 17:44

## 2018-02-21 RX ADMIN — Medication 975 MILLIGRAM(S): at 23:04

## 2018-02-21 RX ADMIN — Medication 975 MILLIGRAM(S): at 23:34

## 2018-02-21 RX ADMIN — Medication 30 MILLIGRAM(S): at 05:25

## 2018-02-21 RX ADMIN — Medication 100 MILLIGRAM(S): at 04:51

## 2018-02-21 RX ADMIN — OXYCODONE HYDROCHLORIDE 5 MILLIGRAM(S): 5 TABLET ORAL at 19:23

## 2018-02-21 RX ADMIN — HEPARIN SODIUM 5000 UNIT(S): 5000 INJECTION INTRAVENOUS; SUBCUTANEOUS at 10:08

## 2018-02-21 NOTE — PROGRESS NOTE ADULT - SUBJECTIVE AND OBJECTIVE BOX
Patient seen and examined at bedside, no acute overnight events. No acute complaints, pain well controlled. Patient is ambulating, voiding spontaneously, passing flatus, and tolerating regular diet. Vaginal bleeding is appropriate. Denies HA, changes in vision, CP, SOB, palpitations, calf tenderness.     Vital Signs Last 24 Hours  T(C): 36.5 (02-21-18 @ 06:13), Max: 37 (02-20-18 @ 17:43)  HR: 90 (02-21-18 @ 06:19) (88 - 113)  BP: 138/88 (02-21-18 @ 06:19) (130/91 - 156/98)  RR: 18 (02-21-18 @ 06:13) (17 - 18)  SpO2: 100% (02-21-18 @ 06:13) (97% - 100%)    Physical Exam:  General: NAD  Abdomen: Soft, non-tender, non-distended, fundus firm  Incision: Pfannenstiel incision CDI, subcuticular suture closure, steri strips in place  Pelvic: Lochia wnl    Labs:    Blood Type: A Positive  Antibody Screen: --  Rubella IgG: Positive (Positive=Immune, Negative=Non-Immune)  RPR: Negative               8.7    14.37 )-----------( 334      ( 02-20 @ 05:15 )             26.4                10.4   17.91 )-----------( 358      ( 02-19 @ 17:48 )             31.8                11.5   7.97  )-----------( 375      ( 02-19 @ 09:20 )             35.3         MEDICATIONS  (STANDING):  acetaminophen   Tablet. 975 milliGRAM(s) Oral every 6 hours  amitriptyline 150 milliGRAM(s) Oral at bedtime  diphenhydrAMINE   Injectable 25 milliGRAM(s) IV Push once  diphtheria/tetanus/pertussis (acellular) Vaccine (ADAcel) 0.5 milliLiter(s) IntraMuscular once  escitalopram 40 milliGRAM(s) Oral daily  famotidine    Tablet 20 milliGRAM(s) Oral two times a day  heparin  Injectable 5000 Unit(s) SubCutaneous every 12 hours  ibuprofen  Tablet 600 milliGRAM(s) Oral every 6 hours  lactated ringers. 1000 milliLiter(s) (125 mL/Hr) IV Continuous <Continuous>  morphine PF Epidural 3 milliGRAM(s) Epidural once  oxyCODONE    IR 5 milliGRAM(s) Oral every 3 hours  oxytocin Infusion 41.667 milliUNIT(s)/Min (125 mL/Hr) IV Continuous <Continuous>  oxytocin Infusion 333.333 milliUNIT(s)/Min (1000 mL/Hr) IV Continuous <Continuous>    MEDICATIONS  (PRN):  diphenhydrAMINE   Capsule 25 milliGRAM(s) Oral every 6 hours PRN Itching  docusate sodium 100 milliGRAM(s) Oral two times a day PRN Stool Softening  glycerin Suppository - Adult 1 Suppository(s) Rectal at bedtime PRN Constipation  HYDROmorphone  Injectable 1 milliGRAM(s) IV Push every 3 hours PRN Severe Pain  lanolin Ointment 1 Application(s) Topical every 3 hours PRN Sore Nipples  LORazepam     Tablet 0.5 milliGRAM(s) Oral five times a day PRN Anxiety  naloxone Injectable 0.1 milliGRAM(s) IV Push every 3 minutes PRN For ANY of the following changes in patient status:  A. RR LESS THAN 10 breaths per minute, B. Oxygen saturation LESS THAN 90%, C. Sedation score of 6  ondansetron Injectable 4 milliGRAM(s) IV Push every 6 hours PRN Nausea  oxyCODONE    IR 5 milliGRAM(s) Oral every 4 hours PRN Severe Pain (7 - 10)  oxyCODONE    IR 5 milliGRAM(s) Oral every 3 hours PRN Mild Pain  oxyCODONE    IR 10 milliGRAM(s) Oral every 3 hours PRN Moderate Pain  simethicone 80 milliGRAM(s) Chew every 4 hours PRN Gas

## 2018-02-21 NOTE — PROGRESS NOTE ADULT - PROBLEM SELECTOR PLAN 1
- Continue with po analgesia  - Increase ambulation  - Continue regular diet  - IV lock  - No labs    POONAM Kemp pgy1

## 2018-02-22 DIAGNOSIS — O42.00 PREMATURE RUPTURE OF MEMBRANES, ONSET OF LABOR WITHIN 24 HOURS OF RUPTURE, UNSPECIFIED WEEKS OF GESTATION: ICD-10-CM

## 2018-02-22 RX ADMIN — Medication 600 MILLIGRAM(S): at 23:02

## 2018-02-22 RX ADMIN — Medication 0.5 MILLIGRAM(S): at 21:14

## 2018-02-22 RX ADMIN — Medication 975 MILLIGRAM(S): at 06:19

## 2018-02-22 RX ADMIN — HEPARIN SODIUM 5000 UNIT(S): 5000 INJECTION INTRAVENOUS; SUBCUTANEOUS at 09:26

## 2018-02-22 RX ADMIN — Medication 975 MILLIGRAM(S): at 23:02

## 2018-02-22 RX ADMIN — OXYCODONE HYDROCHLORIDE 5 MILLIGRAM(S): 5 TABLET ORAL at 09:05

## 2018-02-22 RX ADMIN — Medication 150 MILLIGRAM(S): at 23:02

## 2018-02-22 RX ADMIN — HEPARIN SODIUM 5000 UNIT(S): 5000 INJECTION INTRAVENOUS; SUBCUTANEOUS at 23:04

## 2018-02-22 RX ADMIN — Medication 600 MILLIGRAM(S): at 06:49

## 2018-02-22 RX ADMIN — OXYCODONE HYDROCHLORIDE 5 MILLIGRAM(S): 5 TABLET ORAL at 14:00

## 2018-02-22 RX ADMIN — Medication 600 MILLIGRAM(S): at 17:30

## 2018-02-22 RX ADMIN — Medication 975 MILLIGRAM(S): at 11:36

## 2018-02-22 RX ADMIN — Medication 0.5 MILLIGRAM(S): at 08:27

## 2018-02-22 RX ADMIN — Medication 600 MILLIGRAM(S): at 06:19

## 2018-02-22 RX ADMIN — OXYCODONE HYDROCHLORIDE 5 MILLIGRAM(S): 5 TABLET ORAL at 04:23

## 2018-02-22 RX ADMIN — Medication 600 MILLIGRAM(S): at 11:37

## 2018-02-22 RX ADMIN — Medication 975 MILLIGRAM(S): at 06:49

## 2018-02-22 RX ADMIN — OXYCODONE HYDROCHLORIDE 5 MILLIGRAM(S): 5 TABLET ORAL at 21:30

## 2018-02-22 RX ADMIN — OXYCODONE HYDROCHLORIDE 5 MILLIGRAM(S): 5 TABLET ORAL at 21:14

## 2018-02-22 RX ADMIN — Medication 0.5 MILLIGRAM(S): at 23:02

## 2018-02-22 RX ADMIN — FAMOTIDINE 20 MILLIGRAM(S): 10 INJECTION INTRAVENOUS at 23:02

## 2018-02-22 RX ADMIN — OXYCODONE HYDROCHLORIDE 5 MILLIGRAM(S): 5 TABLET ORAL at 13:24

## 2018-02-22 RX ADMIN — FAMOTIDINE 20 MILLIGRAM(S): 10 INJECTION INTRAVENOUS at 09:26

## 2018-02-22 RX ADMIN — Medication 600 MILLIGRAM(S): at 12:00

## 2018-02-22 RX ADMIN — Medication 600 MILLIGRAM(S): at 18:00

## 2018-02-22 RX ADMIN — Medication 975 MILLIGRAM(S): at 12:00

## 2018-02-22 RX ADMIN — OXYCODONE HYDROCHLORIDE 5 MILLIGRAM(S): 5 TABLET ORAL at 08:31

## 2018-02-22 RX ADMIN — ESCITALOPRAM OXALATE 40 MILLIGRAM(S): 10 TABLET, FILM COATED ORAL at 08:27

## 2018-02-22 RX ADMIN — OXYCODONE HYDROCHLORIDE 5 MILLIGRAM(S): 5 TABLET ORAL at 04:53

## 2018-02-22 RX ADMIN — OXYCODONE HYDROCHLORIDE 5 MILLIGRAM(S): 5 TABLET ORAL at 17:04

## 2018-02-22 RX ADMIN — OXYCODONE HYDROCHLORIDE 5 MILLIGRAM(S): 5 TABLET ORAL at 18:00

## 2018-02-22 NOTE — PROGRESS NOTE ADULT - ASSESSMENT
stable on POD #3  BP in the 130-140's / 80's no signs and symptoms of preeclampsia   swelling of the vulva and cold compresses to are   continue postop care for discharge in am

## 2018-02-22 NOTE — PROGRESS NOTE ADULT - SUBJECTIVE AND OBJECTIVE BOX
Patient seen and examined at bedside, no acute overnight events. No acute complaints, pain well controlled. Patient is ambulating, voiding spontaneously, passing flatus, and tolerating regular diet. Vaginal bleeding is light. Denies HA, changes in vision, CP, SOB, palpitations    Vital Signs Last 24 Hours  T(C): 36.9 (02-22-18 @ 06:29), Max: 37 (02-21-18 @ 14:59)  HR: 95 (02-22-18 @ 06:29) (89 - 108)  BP: 145/86 (02-22-18 @ 06:29) (138/90 - 149/94)  RR: 18 (02-22-18 @ 06:29) (17 - 18)  SpO2: 97% (02-22-18 @ 06:29) (96% - 99%)    Physical Exam:  General: NAD  Abdomen: Soft, non-tender, non-distended, fundus firm  Incision: Pfannenstiel incision CDI, subcuticular suture closure  Pelvic: Lochia wnl    Labs:    Blood Type: A Positive  Antibody Screen: --  Rubella IgG: Positive (Positive=Immune, Negative=Non-Immune)  RPR: Negative               8.7    14.37 )-----------( 334      ( 02-20 @ 05:15 )             26.4                10.4   17.91 )-----------( 358      ( 02-19 @ 17:48 )             31.8                11.5   7.97  )-----------( 375      ( 02-19 @ 09:20 )             35.3         MEDICATIONS  (STANDING):  acetaminophen   Tablet. 975 milliGRAM(s) Oral every 6 hours  amitriptyline 150 milliGRAM(s) Oral at bedtime  diphenhydrAMINE   Injectable 25 milliGRAM(s) IV Push once  diphtheria/tetanus/pertussis (acellular) Vaccine (ADAcel) 0.5 milliLiter(s) IntraMuscular once  escitalopram 40 milliGRAM(s) Oral daily  famotidine    Tablet 20 milliGRAM(s) Oral two times a day  heparin  Injectable 5000 Unit(s) SubCutaneous every 12 hours  ibuprofen  Tablet 600 milliGRAM(s) Oral every 6 hours  lactated ringers. 1000 milliLiter(s) (125 mL/Hr) IV Continuous <Continuous>  morphine PF Epidural 3 milliGRAM(s) Epidural once  oxyCODONE    IR 5 milliGRAM(s) Oral every 3 hours  oxytocin Infusion 41.667 milliUNIT(s)/Min (125 mL/Hr) IV Continuous <Continuous>  oxytocin Infusion 333.333 milliUNIT(s)/Min (1000 mL/Hr) IV Continuous <Continuous>    MEDICATIONS  (PRN):  diphenhydrAMINE   Capsule 25 milliGRAM(s) Oral every 6 hours PRN Itching  docusate sodium 100 milliGRAM(s) Oral two times a day PRN Stool Softening  glycerin Suppository - Adult 1 Suppository(s) Rectal at bedtime PRN Constipation  HYDROmorphone  Injectable 1 milliGRAM(s) IV Push every 3 hours PRN Severe Pain  lanolin Ointment 1 Application(s) Topical every 3 hours PRN Sore Nipples  LORazepam     Tablet 0.5 milliGRAM(s) Oral five times a day PRN Anxiety  naloxone Injectable 0.1 milliGRAM(s) IV Push every 3 minutes PRN For ANY of the following changes in patient status:  A. RR LESS THAN 10 breaths per minute, B. Oxygen saturation LESS THAN 90%, C. Sedation score of 6  ondansetron Injectable 4 milliGRAM(s) IV Push every 6 hours PRN Nausea  oxyCODONE    IR 5 milliGRAM(s) Oral every 4 hours PRN Severe Pain (7 - 10)  oxyCODONE    IR 5 milliGRAM(s) Oral every 3 hours PRN Mild Pain  oxyCODONE    IR 10 milliGRAM(s) Oral every 3 hours PRN Moderate Pain  simethicone 80 milliGRAM(s) Chew every 4 hours PRN Gas

## 2018-02-22 NOTE — PROGRESS NOTE ADULT - SUBJECTIVE AND OBJECTIVE BOX
Pt seen and examined at bedside. Pt states mild abdominal pain. Pt  ambulating, tolerating regular diet, + flatus,  urinating adequately.   Pt denies fever, chills, chest pain, SOB, nausea, vomiting, lightheadedness, dizziness.      T(F): 98.5 (02-22-18 @ 14:32), Max: 98.5 (02-22-18 @ 06:29)  HR: 92 (02-22-18 @ 14:32) (89 - 95)  BP: 142/86 (02-22-18 @ 14:32) (138/90 - 145/86)  RR: 17 (02-22-18 @ 14:32) (17 - 18)  SpO2: 97% (02-22-18 @ 14:32) (96% - 98%)  Wt(kg): --  I&O's Summary      MEDICATIONS  (STANDING):  acetaminophen   Tablet. 975 milliGRAM(s) Oral every 6 hours  amitriptyline 150 milliGRAM(s) Oral at bedtime  diphenhydrAMINE   Injectable 25 milliGRAM(s) IV Push once  diphtheria/tetanus/pertussis (acellular) Vaccine (ADAcel) 0.5 milliLiter(s) IntraMuscular once  escitalopram 40 milliGRAM(s) Oral daily  famotidine    Tablet 20 milliGRAM(s) Oral two times a day  heparin  Injectable 5000 Unit(s) SubCutaneous every 12 hours  ibuprofen  Tablet 600 milliGRAM(s) Oral every 6 hours  lactated ringers. 1000 milliLiter(s) (125 mL/Hr) IV Continuous <Continuous>  morphine PF Epidural 3 milliGRAM(s) Epidural once  oxyCODONE    IR 5 milliGRAM(s) Oral every 3 hours  oxytocin Infusion 41.667 milliUNIT(s)/Min (125 mL/Hr) IV Continuous <Continuous>  oxytocin Infusion 333.333 milliUNIT(s)/Min (1000 mL/Hr) IV Continuous <Continuous>    MEDICATIONS  (PRN):  diphenhydrAMINE   Capsule 25 milliGRAM(s) Oral every 6 hours PRN Itching  docusate sodium 100 milliGRAM(s) Oral two times a day PRN Stool Softening  glycerin Suppository - Adult 1 Suppository(s) Rectal at bedtime PRN Constipation  HYDROmorphone  Injectable 1 milliGRAM(s) IV Push every 3 hours PRN Severe Pain  lanolin Ointment 1 Application(s) Topical every 3 hours PRN Sore Nipples  LORazepam     Tablet 0.5 milliGRAM(s) Oral five times a day PRN Anxiety  naloxone Injectable 0.1 milliGRAM(s) IV Push every 3 minutes PRN For ANY of the following changes in patient status:  A. RR LESS THAN 10 breaths per minute, B. Oxygen saturation LESS THAN 90%, C. Sedation score of 6  ondansetron Injectable 4 milliGRAM(s) IV Push every 6 hours PRN Nausea  oxyCODONE    IR 5 milliGRAM(s) Oral every 4 hours PRN Severe Pain (7 - 10)  oxyCODONE    IR 5 milliGRAM(s) Oral every 3 hours PRN Mild Pain  oxyCODONE    IR 10 milliGRAM(s) Oral every 3 hours PRN Moderate Pain  simethicone 80 milliGRAM(s) Chew every 4 hours PRN Gas      Physical Exam:  Constitutional: NAD  Pulmonary: clear to auscultation bilaterally   Cardiovascular: Regular rate and rhythm   Abdomen: incision site clean, dry, intact. Soft, mildly tender, [] distended, no guarding, no rebound, [] bowel sounds  Extremities: no lower extremity edema or calve tenderness. SCDs in place     LABS:                RADIOLOGY & ADDITIONAL TESTS:

## 2018-02-23 LAB
ALBUMIN SERPL ELPH-MCNC: 3.1 G/DL — LOW (ref 3.3–5)
ALBUMIN SERPL ELPH-MCNC: 3.3 G/DL — SIGNIFICANT CHANGE UP (ref 3.3–5)
ALP SERPL-CCNC: 125 U/L — HIGH (ref 40–120)
ALP SERPL-CCNC: 133 U/L — HIGH (ref 40–120)
ALT FLD-CCNC: 20 U/L — SIGNIFICANT CHANGE UP (ref 4–33)
ALT FLD-CCNC: 20 U/L — SIGNIFICANT CHANGE UP (ref 4–33)
APTT BLD: 25.2 SEC — LOW (ref 27.5–37.4)
APTT BLD: 26.7 SEC — LOW (ref 27.5–37.4)
AST SERPL-CCNC: 23 U/L — SIGNIFICANT CHANGE UP (ref 4–32)
AST SERPL-CCNC: 26 U/L — SIGNIFICANT CHANGE UP (ref 4–32)
BASOPHILS # BLD AUTO: 0.02 K/UL — SIGNIFICANT CHANGE UP (ref 0–0.2)
BASOPHILS # BLD AUTO: 0.04 K/UL — SIGNIFICANT CHANGE UP (ref 0–0.2)
BASOPHILS NFR BLD AUTO: 0.3 % — SIGNIFICANT CHANGE UP (ref 0–2)
BASOPHILS NFR BLD AUTO: 0.4 % — SIGNIFICANT CHANGE UP (ref 0–2)
BILIRUB SERPL-MCNC: < 0.2 MG/DL — LOW (ref 0.2–1.2)
BILIRUB SERPL-MCNC: < 0.2 MG/DL — LOW (ref 0.2–1.2)
BUN SERPL-MCNC: 8 MG/DL — SIGNIFICANT CHANGE UP (ref 7–23)
BUN SERPL-MCNC: 8 MG/DL — SIGNIFICANT CHANGE UP (ref 7–23)
CALCIUM SERPL-MCNC: 7.4 MG/DL — LOW (ref 8.4–10.5)
CALCIUM SERPL-MCNC: 8.3 MG/DL — LOW (ref 8.4–10.5)
CHLORIDE SERPL-SCNC: 101 MMOL/L — SIGNIFICANT CHANGE UP (ref 98–107)
CHLORIDE SERPL-SCNC: 96 MMOL/L — LOW (ref 98–107)
CO2 SERPL-SCNC: 23 MMOL/L — SIGNIFICANT CHANGE UP (ref 22–31)
CO2 SERPL-SCNC: 24 MMOL/L — SIGNIFICANT CHANGE UP (ref 22–31)
CREAT SERPL-MCNC: 0.56 MG/DL — SIGNIFICANT CHANGE UP (ref 0.5–1.3)
CREAT SERPL-MCNC: 0.62 MG/DL — SIGNIFICANT CHANGE UP (ref 0.5–1.3)
EOSINOPHIL # BLD AUTO: 0.32 K/UL — SIGNIFICANT CHANGE UP (ref 0–0.5)
EOSINOPHIL # BLD AUTO: 0.35 K/UL — SIGNIFICANT CHANGE UP (ref 0–0.5)
EOSINOPHIL NFR BLD AUTO: 3.2 % — SIGNIFICANT CHANGE UP (ref 0–6)
EOSINOPHIL NFR BLD AUTO: 4.7 % — SIGNIFICANT CHANGE UP (ref 0–6)
FIBRINOGEN PPP-MCNC: 724 MG/DL — HIGH (ref 310–510)
FIBRINOGEN PPP-MCNC: 799 MG/DL — HIGH (ref 310–510)
GLUCOSE SERPL-MCNC: 124 MG/DL — HIGH (ref 70–99)
GLUCOSE SERPL-MCNC: 141 MG/DL — HIGH (ref 70–99)
HCT VFR BLD CALC: 30.6 % — LOW (ref 34.5–45)
HCT VFR BLD CALC: 32 % — LOW (ref 34.5–45)
HGB BLD-MCNC: 10.2 G/DL — LOW (ref 11.5–15.5)
HGB BLD-MCNC: 9.9 G/DL — LOW (ref 11.5–15.5)
IMM GRANULOCYTES # BLD AUTO: 0.05 # — SIGNIFICANT CHANGE UP
IMM GRANULOCYTES # BLD AUTO: 0.06 # — SIGNIFICANT CHANGE UP
IMM GRANULOCYTES NFR BLD AUTO: 0.5 % — SIGNIFICANT CHANGE UP (ref 0–1.5)
IMM GRANULOCYTES NFR BLD AUTO: 0.8 % — SIGNIFICANT CHANGE UP (ref 0–1.5)
INR BLD: 0.74 — LOW (ref 0.88–1.17)
INR BLD: 0.77 — LOW (ref 0.88–1.17)
LDH SERPL L TO P-CCNC: 282 U/L — HIGH (ref 135–225)
LDH SERPL L TO P-CCNC: 287 U/L — HIGH (ref 135–225)
LYMPHOCYTES # BLD AUTO: 1.56 K/UL — SIGNIFICANT CHANGE UP (ref 1–3.3)
LYMPHOCYTES # BLD AUTO: 1.86 K/UL — SIGNIFICANT CHANGE UP (ref 1–3.3)
LYMPHOCYTES # BLD AUTO: 18.8 % — SIGNIFICANT CHANGE UP (ref 13–44)
LYMPHOCYTES # BLD AUTO: 21.1 % — SIGNIFICANT CHANGE UP (ref 13–44)
MCHC RBC-ENTMCNC: 29.8 PG — SIGNIFICANT CHANGE UP (ref 27–34)
MCHC RBC-ENTMCNC: 30.1 PG — SIGNIFICANT CHANGE UP (ref 27–34)
MCHC RBC-ENTMCNC: 31.9 % — LOW (ref 32–36)
MCHC RBC-ENTMCNC: 32.4 % — SIGNIFICANT CHANGE UP (ref 32–36)
MCV RBC AUTO: 93 FL — SIGNIFICANT CHANGE UP (ref 80–100)
MCV RBC AUTO: 93.6 FL — SIGNIFICANT CHANGE UP (ref 80–100)
MONOCYTES # BLD AUTO: 0.36 K/UL — SIGNIFICANT CHANGE UP (ref 0–0.9)
MONOCYTES # BLD AUTO: 0.66 K/UL — SIGNIFICANT CHANGE UP (ref 0–0.9)
MONOCYTES NFR BLD AUTO: 4.9 % — SIGNIFICANT CHANGE UP (ref 2–14)
MONOCYTES NFR BLD AUTO: 6.7 % — SIGNIFICANT CHANGE UP (ref 2–14)
NEUTROPHILS # BLD AUTO: 5.04 K/UL — SIGNIFICANT CHANGE UP (ref 1.8–7.4)
NEUTROPHILS # BLD AUTO: 6.97 K/UL — SIGNIFICANT CHANGE UP (ref 1.8–7.4)
NEUTROPHILS NFR BLD AUTO: 68.2 % — SIGNIFICANT CHANGE UP (ref 43–77)
NEUTROPHILS NFR BLD AUTO: 70.4 % — SIGNIFICANT CHANGE UP (ref 43–77)
NRBC # FLD: 0 — SIGNIFICANT CHANGE UP
NRBC # FLD: 0 — SIGNIFICANT CHANGE UP
PLATELET # BLD AUTO: 570 K/UL — HIGH (ref 150–400)
PLATELET # BLD AUTO: 632 K/UL — HIGH (ref 150–400)
PMV BLD: 9.3 FL — SIGNIFICANT CHANGE UP (ref 7–13)
PMV BLD: 9.6 FL — SIGNIFICANT CHANGE UP (ref 7–13)
POTASSIUM SERPL-MCNC: 4 MMOL/L — SIGNIFICANT CHANGE UP (ref 3.5–5.3)
POTASSIUM SERPL-MCNC: 4 MMOL/L — SIGNIFICANT CHANGE UP (ref 3.5–5.3)
POTASSIUM SERPL-SCNC: 4 MMOL/L — SIGNIFICANT CHANGE UP (ref 3.5–5.3)
POTASSIUM SERPL-SCNC: 4 MMOL/L — SIGNIFICANT CHANGE UP (ref 3.5–5.3)
PROT SERPL-MCNC: 6.2 G/DL — SIGNIFICANT CHANGE UP (ref 6–8.3)
PROT SERPL-MCNC: 6.2 G/DL — SIGNIFICANT CHANGE UP (ref 6–8.3)
PROTHROM AB SERPL-ACNC: 8.4 SEC — LOW (ref 9.8–13.1)
PROTHROM AB SERPL-ACNC: 8.5 SEC — LOW (ref 9.8–13.1)
RBC # BLD: 3.29 M/UL — LOW (ref 3.8–5.2)
RBC # BLD: 3.42 M/UL — LOW (ref 3.8–5.2)
RBC # FLD: 12.7 % — SIGNIFICANT CHANGE UP (ref 10.3–14.5)
RBC # FLD: 12.8 % — SIGNIFICANT CHANGE UP (ref 10.3–14.5)
SODIUM SERPL-SCNC: 135 MMOL/L — SIGNIFICANT CHANGE UP (ref 135–145)
SODIUM SERPL-SCNC: 138 MMOL/L — SIGNIFICANT CHANGE UP (ref 135–145)
URATE SERPL-MCNC: 5.6 MG/DL — SIGNIFICANT CHANGE UP (ref 2.5–7)
URATE SERPL-MCNC: 5.6 MG/DL — SIGNIFICANT CHANGE UP (ref 2.5–7)
WBC # BLD: 7.39 K/UL — SIGNIFICANT CHANGE UP (ref 3.8–10.5)
WBC # BLD: 9.9 K/UL — SIGNIFICANT CHANGE UP (ref 3.8–10.5)
WBC # FLD AUTO: 7.39 K/UL — SIGNIFICANT CHANGE UP (ref 3.8–10.5)
WBC # FLD AUTO: 9.9 K/UL — SIGNIFICANT CHANGE UP (ref 3.8–10.5)

## 2018-02-23 RX ORDER — MAGNESIUM SULFATE 500 MG/ML
2 VIAL (ML) INJECTION
Qty: 40 | Refills: 0 | Status: DISCONTINUED | OUTPATIENT
Start: 2018-02-23 | End: 2018-02-24

## 2018-02-23 RX ORDER — SODIUM CHLORIDE 9 MG/ML
1000 INJECTION, SOLUTION INTRAVENOUS
Qty: 0 | Refills: 0 | Status: DISCONTINUED | OUTPATIENT
Start: 2018-02-23 | End: 2018-02-24

## 2018-02-23 RX ORDER — NIFEDIPINE 30 MG
30 TABLET, EXTENDED RELEASE 24 HR ORAL ONCE
Qty: 0 | Refills: 0 | Status: COMPLETED | OUTPATIENT
Start: 2018-02-23 | End: 2018-02-23

## 2018-02-23 RX ORDER — NIFEDIPINE 30 MG
10 TABLET, EXTENDED RELEASE 24 HR ORAL ONCE
Qty: 0 | Refills: 0 | Status: COMPLETED | OUTPATIENT
Start: 2018-02-23 | End: 2018-02-23

## 2018-02-23 RX ORDER — NIFEDIPINE 30 MG
30 TABLET, EXTENDED RELEASE 24 HR ORAL DAILY
Qty: 0 | Refills: 0 | Status: DISCONTINUED | OUTPATIENT
Start: 2018-02-23 | End: 2018-02-23

## 2018-02-23 RX ORDER — MAGNESIUM SULFATE 500 MG/ML
4 VIAL (ML) INJECTION ONCE
Qty: 0 | Refills: 0 | Status: COMPLETED | OUTPATIENT
Start: 2018-02-23 | End: 2018-02-23

## 2018-02-23 RX ADMIN — Medication 600 MILLIGRAM(S): at 07:48

## 2018-02-23 RX ADMIN — Medication 600 MILLIGRAM(S): at 07:14

## 2018-02-23 RX ADMIN — Medication 150 MILLIGRAM(S): at 22:33

## 2018-02-23 RX ADMIN — Medication 975 MILLIGRAM(S): at 19:00

## 2018-02-23 RX ADMIN — Medication 975 MILLIGRAM(S): at 00:30

## 2018-02-23 RX ADMIN — Medication 600 MILLIGRAM(S): at 15:27

## 2018-02-23 RX ADMIN — Medication 600 MILLIGRAM(S): at 13:07

## 2018-02-23 RX ADMIN — OXYCODONE HYDROCHLORIDE 5 MILLIGRAM(S): 5 TABLET ORAL at 01:53

## 2018-02-23 RX ADMIN — OXYCODONE HYDROCHLORIDE 5 MILLIGRAM(S): 5 TABLET ORAL at 16:24

## 2018-02-23 RX ADMIN — OXYCODONE HYDROCHLORIDE 5 MILLIGRAM(S): 5 TABLET ORAL at 02:30

## 2018-02-23 RX ADMIN — OXYCODONE HYDROCHLORIDE 5 MILLIGRAM(S): 5 TABLET ORAL at 21:00

## 2018-02-23 RX ADMIN — Medication 30 MILLIGRAM(S): at 11:12

## 2018-02-23 RX ADMIN — Medication 975 MILLIGRAM(S): at 11:40

## 2018-02-23 RX ADMIN — Medication 600 MILLIGRAM(S): at 19:00

## 2018-02-23 RX ADMIN — Medication 975 MILLIGRAM(S): at 18:21

## 2018-02-23 RX ADMIN — Medication 50 GM/HR: at 11:08

## 2018-02-23 RX ADMIN — ESCITALOPRAM OXALATE 40 MILLIGRAM(S): 10 TABLET, FILM COATED ORAL at 09:38

## 2018-02-23 RX ADMIN — Medication 300 GRAM(S): at 10:48

## 2018-02-23 RX ADMIN — Medication 0.5 MILLIGRAM(S): at 16:19

## 2018-02-23 RX ADMIN — OXYCODONE HYDROCHLORIDE 5 MILLIGRAM(S): 5 TABLET ORAL at 17:00

## 2018-02-23 RX ADMIN — SODIUM CHLORIDE 50 MILLILITER(S): 9 INJECTION, SOLUTION INTRAVENOUS at 19:00

## 2018-02-23 RX ADMIN — OXYCODONE HYDROCHLORIDE 5 MILLIGRAM(S): 5 TABLET ORAL at 13:06

## 2018-02-23 RX ADMIN — OXYCODONE HYDROCHLORIDE 5 MILLIGRAM(S): 5 TABLET ORAL at 06:25

## 2018-02-23 RX ADMIN — Medication 0.5 MILLIGRAM(S): at 10:15

## 2018-02-23 RX ADMIN — Medication 10 MILLIGRAM(S): at 09:36

## 2018-02-23 RX ADMIN — Medication 50 GM/HR: at 19:00

## 2018-02-23 RX ADMIN — OXYCODONE HYDROCHLORIDE 5 MILLIGRAM(S): 5 TABLET ORAL at 07:48

## 2018-02-23 RX ADMIN — OXYCODONE HYDROCHLORIDE 5 MILLIGRAM(S): 5 TABLET ORAL at 20:52

## 2018-02-23 RX ADMIN — Medication 975 MILLIGRAM(S): at 12:00

## 2018-02-23 RX ADMIN — Medication 0.5 MILLIGRAM(S): at 09:36

## 2018-02-23 RX ADMIN — HEPARIN SODIUM 5000 UNIT(S): 5000 INJECTION INTRAVENOUS; SUBCUTANEOUS at 10:51

## 2018-02-23 RX ADMIN — OXYCODONE HYDROCHLORIDE 5 MILLIGRAM(S): 5 TABLET ORAL at 13:25

## 2018-02-23 RX ADMIN — Medication 600 MILLIGRAM(S): at 00:30

## 2018-02-23 RX ADMIN — HEPARIN SODIUM 5000 UNIT(S): 5000 INJECTION INTRAVENOUS; SUBCUTANEOUS at 22:33

## 2018-02-23 RX ADMIN — Medication 0.5 MILLIGRAM(S): at 06:25

## 2018-02-23 RX ADMIN — FAMOTIDINE 20 MILLIGRAM(S): 10 INJECTION INTRAVENOUS at 10:27

## 2018-02-23 RX ADMIN — Medication 600 MILLIGRAM(S): at 18:20

## 2018-02-23 RX ADMIN — FAMOTIDINE 20 MILLIGRAM(S): 10 INJECTION INTRAVENOUS at 22:32

## 2018-02-23 NOTE — PROGRESS NOTE ADULT - ATTENDING COMMENTS
Patient seen, c/o anxiety and wants discharge.  Has intermittent HA and leg swelling  //100  123/76 post procardia 10 and ativan 0.5  Abd soft NT/ND incision C/D/I  Ext -c/c/ +edema    pro/cr 0.47 on admission  repeat labs being drawn    IMP  POD#4  post RCS  severe range BP, PEC  PLAN  repeat PIH labs  start magnesium sulfate  start procardia 30 xl  dw patient and  risk of untreated PEC  ANAHI Chatman

## 2018-02-23 NOTE — PROGRESS NOTE ADULT - PROBLEM SELECTOR PLAN 1
- Continue with po analgesia  - Increase ambulation  - Continue regular diet  - HELLP labs stat  - procardia 10mg IR given, BPs decreased to 120s/70s  - will start procardia 30mg qday for BP control  - Will start magnesium for seizure prophylaxis    d/w Dr. Compa Kemp pgy1

## 2018-02-23 NOTE — PROGRESS NOTE ADULT - SUBJECTIVE AND OBJECTIVE BOX
Patient seen and examined at bedside, no acute overnight events. This AM, she had 2 recurrent severe range BPs.  No acute complaints, pain well controlled. Patient states that she is not currently experiencing anxiety. Denies HA, changes in vision, CP, SOB, palpitations, calf tenderness. Patient is ambulating, voiding spontaneously, passing flatus, and tolerating regular diet.     Vital Signs Last 24 Hours  T(C): 36.8 (02-23-18 @ 09:13), Max: 36.9 (02-22-18 @ 14:32)  HR: 83 (02-23-18 @ 09:28) (80 - 96)  BP: 123/77 (02-23-18 @ 10:00) (123/77 - 174/92)  RR: 16 (02-23-18 @ 09:13) (16 - 18)  SpO2: 99% (02-23-18 @ 09:13) (96% - 99%)    Physical Exam:  General: NAD  Abdomen: Soft, non-tender, non-distended, fundus firm  Incision: Pfannenstiel incision CDI, subcuticular suture closure  Pelvic: Lochia wnl  Neuro: CN II-XII intact, EOMI, PERRLA    Labs:    Blood Type: A Positive  Antibody Screen: --  Rubella IgG: Positive (Positive=Immune, Negative=Non-Immune)  RPR: Negative               8.7    14.37 )-----------( 334      ( 02-20 @ 05:15 )             26.4                10.4   17.91 )-----------( 358      ( 02-19 @ 17:48 )             31.8                11.5   7.97  )-----------( 375      ( 02-19 @ 09:20 )             35.3         MEDICATIONS  (STANDING):  acetaminophen   Tablet. 975 milliGRAM(s) Oral every 6 hours  amitriptyline 150 milliGRAM(s) Oral at bedtime  diphenhydrAMINE   Injectable 25 milliGRAM(s) IV Push once  diphtheria/tetanus/pertussis (acellular) Vaccine (ADAcel) 0.5 milliLiter(s) IntraMuscular once  escitalopram 40 milliGRAM(s) Oral daily  famotidine    Tablet 20 milliGRAM(s) Oral two times a day  heparin  Injectable 5000 Unit(s) SubCutaneous every 12 hours  ibuprofen  Tablet 600 milliGRAM(s) Oral every 6 hours  lactated ringers. 1000 milliLiter(s) (125 mL/Hr) IV Continuous <Continuous>  magnesium sulfate  IVPB 4 Gram(s) IV Intermittent once  magnesium sulfate Infusion 2 Gm/Hr (50 mL/Hr) IV Continuous <Continuous>  morphine PF Epidural 3 milliGRAM(s) Epidural once  NIFEdipine XL 30 milliGRAM(s) Oral daily  oxyCODONE    IR 5 milliGRAM(s) Oral every 3 hours  oxytocin Infusion 41.667 milliUNIT(s)/Min (125 mL/Hr) IV Continuous <Continuous>  oxytocin Infusion 333.333 milliUNIT(s)/Min (1000 mL/Hr) IV Continuous <Continuous>    MEDICATIONS  (PRN):  diphenhydrAMINE   Capsule 25 milliGRAM(s) Oral every 6 hours PRN Itching  docusate sodium 100 milliGRAM(s) Oral two times a day PRN Stool Softening  glycerin Suppository - Adult 1 Suppository(s) Rectal at bedtime PRN Constipation  HYDROmorphone  Injectable 1 milliGRAM(s) IV Push every 3 hours PRN Severe Pain  lanolin Ointment 1 Application(s) Topical every 3 hours PRN Sore Nipples  LORazepam     Tablet 0.5 milliGRAM(s) Oral five times a day PRN Anxiety  naloxone Injectable 0.1 milliGRAM(s) IV Push every 3 minutes PRN For ANY of the following changes in patient status:  A. RR LESS THAN 10 breaths per minute, B. Oxygen saturation LESS THAN 90%, C. Sedation score of 6  ondansetron Injectable 4 milliGRAM(s) IV Push every 6 hours PRN Nausea  oxyCODONE    IR 5 milliGRAM(s) Oral every 4 hours PRN Severe Pain (7 - 10)  oxyCODONE    IR 5 milliGRAM(s) Oral every 3 hours PRN Mild Pain  oxyCODONE    IR 10 milliGRAM(s) Oral every 3 hours PRN Moderate Pain  simethicone 80 milliGRAM(s) Chew every 4 hours PRN Gas

## 2018-02-23 NOTE — PROGRESS NOTE ADULT - ASSESSMENT
38y/o  [facility]. Patient is now a severe preeclamptic.

## 2018-02-24 VITALS
OXYGEN SATURATION: 99 % | SYSTOLIC BLOOD PRESSURE: 143 MMHG | RESPIRATION RATE: 18 BRPM | DIASTOLIC BLOOD PRESSURE: 82 MMHG | HEART RATE: 95 BPM | TEMPERATURE: 99 F

## 2018-02-24 RX ORDER — NIFEDIPINE 30 MG
1 TABLET, EXTENDED RELEASE 24 HR ORAL
Qty: 30 | Refills: 0 | OUTPATIENT
Start: 2018-02-24 | End: 2018-03-25

## 2018-02-24 RX ADMIN — OXYCODONE HYDROCHLORIDE 5 MILLIGRAM(S): 5 TABLET ORAL at 05:23

## 2018-02-24 RX ADMIN — Medication 975 MILLIGRAM(S): at 15:00

## 2018-02-24 RX ADMIN — Medication 600 MILLIGRAM(S): at 01:14

## 2018-02-24 RX ADMIN — Medication 975 MILLIGRAM(S): at 01:14

## 2018-02-24 RX ADMIN — OXYCODONE HYDROCHLORIDE 5 MILLIGRAM(S): 5 TABLET ORAL at 05:50

## 2018-02-24 RX ADMIN — Medication 975 MILLIGRAM(S): at 02:00

## 2018-02-24 RX ADMIN — OXYCODONE HYDROCHLORIDE 5 MILLIGRAM(S): 5 TABLET ORAL at 02:27

## 2018-02-24 RX ADMIN — SODIUM CHLORIDE 50 MILLILITER(S): 9 INJECTION, SOLUTION INTRAVENOUS at 04:53

## 2018-02-24 RX ADMIN — Medication 0.5 MILLIGRAM(S): at 10:21

## 2018-02-24 RX ADMIN — ESCITALOPRAM OXALATE 40 MILLIGRAM(S): 10 TABLET, FILM COATED ORAL at 07:36

## 2018-02-24 RX ADMIN — Medication 50 GM/HR: at 04:53

## 2018-02-24 RX ADMIN — Medication 50 GM/HR: at 07:26

## 2018-02-24 RX ADMIN — HEPARIN SODIUM 5000 UNIT(S): 5000 INJECTION INTRAVENOUS; SUBCUTANEOUS at 09:39

## 2018-02-24 RX ADMIN — FAMOTIDINE 20 MILLIGRAM(S): 10 INJECTION INTRAVENOUS at 09:39

## 2018-02-24 RX ADMIN — Medication 600 MILLIGRAM(S): at 02:00

## 2018-02-24 RX ADMIN — OXYCODONE HYDROCHLORIDE 5 MILLIGRAM(S): 5 TABLET ORAL at 03:00

## 2018-02-24 RX ADMIN — Medication 975 MILLIGRAM(S): at 07:28

## 2018-02-24 RX ADMIN — Medication 0.5 MILLIGRAM(S): at 06:00

## 2018-02-24 RX ADMIN — Medication 975 MILLIGRAM(S): at 08:00

## 2018-02-24 RX ADMIN — Medication 975 MILLIGRAM(S): at 14:26

## 2018-02-24 NOTE — PROGRESS NOTE ADULT - SUBJECTIVE AND OBJECTIVE BOX
Postpartum Note,  Section   37y year old woman post-operative day 5 from uncomplicated repeat LTCS.  No acute events overnight.  Patient has no complaints and pain is well-controlled.  Patient is tolerating regular diet, passing flatus, ambulating, and is voiding spontaneously.  Postpartum bleeding is well controlled.    Physical exam:    Vital Signs Last 24 Hrs  T(C): 36.5 (2018 05:13), Max: 36.9 (2018 10:41)  T(F): 97.7 (2018 05:13), Max: 98.4 (2018 10:41)  HR: 96 (2018 05:13) (80 - 132)  BP: 129/78 (2018 05:13) (115/77 - 174/92)  BP(mean): --  RR: 17 (2018 05:13) (16 - 20)  SpO2: 98% (2018 05:13) (95% - 99%)    Gen: NAD  Abdomen: Soft, nontender, no distension , firm uterine fundus at umbilicus.  Incision: Clean, dry, and intact  Pelvic: Normal lochia noted  Ext: No calf tenderness    LABS:                        9.9    9.90  )-----------( 632      ( 2018 22:45 )             30.6                         10.2   7.39  )-----------( 570      ( 2018 10:48 )             32.0       18 @ 22:45      135  |  96<L>  |  8   ----------------------------<  141<H>  4.0   |  24  |  0.56    18 @ 10:48      138  |  101  |  8   ----------------------------<  124<H>  4.0   |  23  |  0.62        Ca    7.4<L>      2018 22:45  Ca    8.3<L>      2018 10:48    TPro  6.2  /  Alb  3.3  /  TBili  < 0.2<L>  /  DBili  x   /  AST  23  /  ALT  20  /  AlkPhos  125<H>  18 @ 22:45  TPro  6.2  /  Alb  3.1<L>  /  TBili  < 0.2<L>  /  DBili  x   /  AST  26  /  ALT  20  /  AlkPhos  133<H>  18 @ 10:48

## 2018-02-24 NOTE — PROGRESS NOTE ADULT - SUBJECTIVE AND OBJECTIVE BOX
Pt seen and examined at bedside. Pt states mild abdominal pain. Pt ambulating, tolerating  diet,flatus, BM,  urinating adequately.   Pt denies fever, chills, chest pain, SOB, nausea, vomiting, lightheadedness, dizziness.      T(F): 97.9 (02-24-18 @ 09:35), Max: 98 (02-24-18 @ 03:16)  HR: 90 (02-24-18 @ 09:35) (90 - 108)  BP: 129/84 (02-24-18 @ 09:35) (115/77 - 152/90)  RR: 17 (02-24-18 @ 09:35) (16 - 20)  SpO2: 96% (02-24-18 @ 09:35) (95% - 99%)  Wt(kg): --  I&O's Summary    23 Feb 2018 07:01  -  24 Feb 2018 07:00  --------------------------------------------------------  IN: 1950 mL / OUT: 8000 mL / NET: -6050 mL    24 Feb 2018 07:01  -  24 Feb 2018 13:18  --------------------------------------------------------  IN: 0 mL / OUT: 800 mL / NET: -800 mL        MEDICATIONS  (STANDING):  acetaminophen   Tablet. 975 milliGRAM(s) Oral every 6 hours  amitriptyline 150 milliGRAM(s) Oral at bedtime  diphenhydrAMINE   Injectable 25 milliGRAM(s) IV Push once  diphtheria/tetanus/pertussis (acellular) Vaccine (ADAcel) 0.5 milliLiter(s) IntraMuscular once  escitalopram 40 milliGRAM(s) Oral daily  famotidine    Tablet 20 milliGRAM(s) Oral two times a day  heparin  Injectable 5000 Unit(s) SubCutaneous every 12 hours  ibuprofen  Tablet 600 milliGRAM(s) Oral every 6 hours  lactated ringers. 1000 milliLiter(s) (125 mL/Hr) IV Continuous <Continuous>  lactated ringers. 1000 milliLiter(s) (50 mL/Hr) IV Continuous <Continuous>  morphine PF Epidural 3 milliGRAM(s) Epidural once  oxyCODONE    IR 5 milliGRAM(s) Oral every 3 hours  oxytocin Infusion 41.667 milliUNIT(s)/Min (125 mL/Hr) IV Continuous <Continuous>  oxytocin Infusion 333.333 milliUNIT(s)/Min (1000 mL/Hr) IV Continuous <Continuous>    MEDICATIONS  (PRN):  diphenhydrAMINE   Capsule 25 milliGRAM(s) Oral every 6 hours PRN Itching  docusate sodium 100 milliGRAM(s) Oral two times a day PRN Stool Softening  glycerin Suppository - Adult 1 Suppository(s) Rectal at bedtime PRN Constipation  HYDROmorphone  Injectable 1 milliGRAM(s) IV Push every 3 hours PRN Severe Pain  lanolin Ointment 1 Application(s) Topical every 3 hours PRN Sore Nipples  LORazepam     Tablet 0.5 milliGRAM(s) Oral five times a day PRN Anxiety  naloxone Injectable 0.1 milliGRAM(s) IV Push every 3 minutes PRN For ANY of the following changes in patient status:  A. RR LESS THAN 10 breaths per minute, B. Oxygen saturation LESS THAN 90%, C. Sedation score of 6  ondansetron Injectable 4 milliGRAM(s) IV Push every 6 hours PRN Nausea  oxyCODONE    IR 5 milliGRAM(s) Oral every 4 hours PRN Severe Pain (7 - 10)  oxyCODONE    IR 5 milliGRAM(s) Oral every 3 hours PRN Mild Pain  oxyCODONE    IR 10 milliGRAM(s) Oral every 3 hours PRN Moderate Pain  simethicone 80 milliGRAM(s) Chew every 4 hours PRN Gas      Physical Exam:  Constitutional: NAD  Pulmonary: clear to auscultation bilaterally   Cardiovascular: Regular rate and rhythm   Abdomen: incision site clean, dry, intact. Soft, mildly tender, [] distended, no guarding, no rebound, [] bowel sounds  Extremities: no lower extremity edema or calve tenderness. SCDs in place     LABS:                        9.9    9.90  )-----------( 632      ( 23 Feb 2018 22:45 )             30.6     02-23    135  |  96<L>  |  8   ----------------------------<  141<H>  4.0   |  24  |  0.56    Ca    7.4<L>      23 Feb 2018 22:45    TPro  6.2  /  Alb  3.3  /  TBili  < 0.2<L>  /  DBili  x   /  AST  23  /  ALT  20  /  AlkPhos  125<H>  02-23    PT/INR - ( 23 Feb 2018 22:45 )   PT: 8.4 SEC;   INR: 0.74          PTT - ( 23 Feb 2018 22:45 )  PTT:25.2 SEC      RADIOLOGY & ADDITIONAL TESTS:

## 2018-02-24 NOTE — PROGRESS NOTE ADULT - PROBLEM SELECTOR PLAN 1
-Encourage Ambulation  -Continue with regular diet  -Heparin, SCDs, and ambulation for DVT ppx  -Analgesia as needed  -discontinue Magnesium this morning    Ramo Valerio MD PGY1

## 2018-02-24 NOTE — PROGRESS NOTE ADULT - ASSESSMENT
s/p mgso4 normal BP s/p procardia xl x1   all labs normal   Plan for discharge to home if BP normal at 2 pm

## 2018-02-24 NOTE — DISCHARGE NOTE ADULT - PATIENT PORTAL LINK FT
You can access the [facility], offered by [facility], by registering with the following website: [email or web address]

## 2018-02-27 NOTE — PATIENT PROFILE OB - BABYS CARE PROVIDER NAME, OB PROFILE
Airway  Urgency: elective    Airway not difficult    General Information and Staff    Patient location during procedure: OR  CRNA: MONICA STANLEY    Indications and Patient Condition  Indications for airway management: airway protection    Preoxygenated: yes  MILS maintained throughout  Mask difficulty assessment: 0 - not attempted    Final Airway Details  Final airway type: endotracheal airway      Successful airway: ETT  Cuffed: yes   Successful intubation technique: direct laryngoscopy  Endotracheal tube insertion site: oral  Blade: Sheppard  Blade size: #2  ETT size: 7.5 mm  Cormack-Lehane Classification: grade I - full view of glottis  Placement verified by: chest auscultation, capnometry and palpation of cuff   Measured from: lips  ETT to lips (cm): 21  Number of attempts at approach: 1    Additional Comments  C-spine neutral with DL: C-collar intact. DCV. Surgeon present during DL            
Ash Caldwell

## 2019-10-22 NOTE — PROVIDER CONTACT NOTE (OTHER) - BACKGROUND
Improved   pt s/p  repeat c/s livebirth with EBL of 900cc, pt has hx of labile b/p PIH labs sent result neg' pt has hx of fibromyalgia and complain of pain at this time pain medication given, pt also has hx of

## 2020-01-06 ENCOUNTER — RESULT REVIEW (OUTPATIENT)
Age: 40
End: 2020-01-06

## 2022-10-05 ENCOUNTER — APPOINTMENT (OUTPATIENT)
Dept: ULTRASOUND IMAGING | Facility: IMAGING CENTER | Age: 42
End: 2022-10-05

## 2022-10-05 ENCOUNTER — APPOINTMENT (OUTPATIENT)
Dept: MAMMOGRAPHY | Facility: IMAGING CENTER | Age: 42
End: 2022-10-05

## 2022-10-05 ENCOUNTER — OUTPATIENT (OUTPATIENT)
Dept: OUTPATIENT SERVICES | Facility: HOSPITAL | Age: 42
LOS: 1 days | End: 2022-10-05
Payer: COMMERCIAL

## 2022-10-05 DIAGNOSIS — Z00.8 ENCOUNTER FOR OTHER GENERAL EXAMINATION: ICD-10-CM

## 2022-10-05 PROCEDURE — 77067 SCR MAMMO BI INCL CAD: CPT

## 2022-10-05 PROCEDURE — 77067 SCR MAMMO BI INCL CAD: CPT | Mod: 26

## 2022-10-05 PROCEDURE — 77063 BREAST TOMOSYNTHESIS BI: CPT | Mod: 26

## 2022-10-05 PROCEDURE — 77063 BREAST TOMOSYNTHESIS BI: CPT

## 2023-12-11 NOTE — CHART NOTE - NSCHARTNOTESELECT_GEN_ALL_CORE
Here today for 2-1/2 year old health maintenance visit. Immunizations up-to-date.  vision screen today. Fluoride applied We will see back at 3 years or sooner if needed  Discussed bedtime, sleep, temper tantrums.  Make dental appointment, consider spin brush and change to fluoride toothpaste.    Ob Note/Event Note

## 2024-07-15 ENCOUNTER — TRANSCRIPTION ENCOUNTER (OUTPATIENT)
Age: 44
End: 2024-07-15

## 2025-05-10 ENCOUNTER — OUTPATIENT (OUTPATIENT)
Dept: OUTPATIENT SERVICES | Facility: HOSPITAL | Age: 45
LOS: 1 days | End: 2025-05-10
Payer: COMMERCIAL

## 2025-05-10 ENCOUNTER — APPOINTMENT (OUTPATIENT)
Dept: MAMMOGRAPHY | Facility: IMAGING CENTER | Age: 45
End: 2025-05-10
Payer: COMMERCIAL

## 2025-05-10 DIAGNOSIS — Z00.8 ENCOUNTER FOR OTHER GENERAL EXAMINATION: ICD-10-CM

## 2025-05-10 PROCEDURE — 77063 BREAST TOMOSYNTHESIS BI: CPT

## 2025-05-10 PROCEDURE — 77063 BREAST TOMOSYNTHESIS BI: CPT | Mod: 26

## 2025-05-10 PROCEDURE — 77067 SCR MAMMO BI INCL CAD: CPT

## 2025-05-10 PROCEDURE — 77067 SCR MAMMO BI INCL CAD: CPT | Mod: 26
